# Patient Record
Sex: MALE | Race: WHITE | Employment: FULL TIME | URBAN - METROPOLITAN AREA
[De-identification: names, ages, dates, MRNs, and addresses within clinical notes are randomized per-mention and may not be internally consistent; named-entity substitution may affect disease eponyms.]

---

## 2018-01-09 ENCOUNTER — OFFICE VISIT (OUTPATIENT)
Dept: OTHER | Facility: HOSPITAL | Age: 38
End: 2018-01-09
Attending: ORTHOPAEDIC SURGERY

## 2018-01-09 ENCOUNTER — HOSPITAL ENCOUNTER (OUTPATIENT)
Dept: GENERAL RADIOLOGY | Facility: HOSPITAL | Age: 38
Discharge: HOME | End: 2018-01-09
Attending: ORTHOPAEDIC SURGERY

## 2018-01-09 DIAGNOSIS — R52 PAIN: ICD-10-CM

## 2018-01-09 DIAGNOSIS — R52 PAIN: Primary | ICD-10-CM

## 2018-01-09 PROCEDURE — 73562 X-RAY EXAM OF KNEE 3: CPT | Performed by: ORTHOPAEDIC SURGERY

## 2018-05-08 ENCOUNTER — APPOINTMENT (OUTPATIENT)
Dept: OTHER | Facility: HOSPITAL | Age: 38
End: 2018-05-08
Attending: ORTHOPAEDIC SURGERY

## 2018-06-04 ENCOUNTER — OFFICE VISIT (OUTPATIENT)
Dept: INTERNAL MEDICINE CLINIC | Facility: CLINIC | Age: 38
End: 2018-06-04

## 2018-06-04 VITALS
SYSTOLIC BLOOD PRESSURE: 134 MMHG | WEIGHT: 244 LBS | TEMPERATURE: 98 F | HEIGHT: 72 IN | HEART RATE: 84 BPM | DIASTOLIC BLOOD PRESSURE: 82 MMHG | BODY MASS INDEX: 33.05 KG/M2 | RESPIRATION RATE: 12 BRPM | OXYGEN SATURATION: 98 %

## 2018-06-04 DIAGNOSIS — R19.7 DIARRHEA, UNSPECIFIED TYPE: ICD-10-CM

## 2018-06-04 DIAGNOSIS — Z00.00 PHYSICAL EXAM: ICD-10-CM

## 2018-06-04 DIAGNOSIS — J30.2 SEASONAL ALLERGIES: Primary | ICD-10-CM

## 2018-06-04 PROCEDURE — 99202 OFFICE O/P NEW SF 15 MIN: CPT | Performed by: FAMILY MEDICINE

## 2018-06-04 NOTE — PROGRESS NOTES
Milan Schaumann is a 40year old male and is new to our office. Patient presents today to establish care.     Past pcp:ynanick Berry. - is retiring      Pt presents today for :      Looking for new doctor    Thinks getting allergies as 98%   BMI 33.09 kg/m²   GEN:  Well developed, well nourished, in no apparent distress  EYE: Bilateral conjunctiva and lids normal  HENT: Moist oral mucosa, normal teeth  NECK: No lymphadenopathy or masses  CAR:  RRR, no murmurs, S1 and S2 normal  PULM: Manjinder

## 2018-06-08 ENCOUNTER — TELEPHONE (OUTPATIENT)
Dept: INTERNAL MEDICINE CLINIC | Facility: CLINIC | Age: 38
End: 2018-06-08

## 2018-06-08 NOTE — TELEPHONE ENCOUNTER
Requested Medical records from Dr Erika Marquez, [address] [place] [address], [phone] fax [phone]

## 2018-06-11 ENCOUNTER — NURSE ONLY (OUTPATIENT)
Dept: INTERNAL MEDICINE CLINIC | Facility: CLINIC | Age: 38
End: 2018-06-11

## 2018-06-11 DIAGNOSIS — R19.7 DIARRHEA, UNSPECIFIED TYPE: ICD-10-CM

## 2018-06-11 DIAGNOSIS — Z00.00 PHYSICAL EXAM: ICD-10-CM

## 2018-06-11 DIAGNOSIS — J30.2 SEASONAL ALLERGIES: ICD-10-CM

## 2018-06-11 PROCEDURE — 86003 ALLG SPEC IGE CRUDE XTRC EA: CPT | Performed by: FAMILY MEDICINE

## 2018-06-11 PROCEDURE — 85025 COMPLETE CBC W/AUTO DIFF WBC: CPT | Performed by: FAMILY MEDICINE

## 2018-06-11 PROCEDURE — 80053 COMPREHEN METABOLIC PANEL: CPT | Performed by: FAMILY MEDICINE

## 2018-06-11 PROCEDURE — 36415 COLL VENOUS BLD VENIPUNCTURE: CPT | Performed by: FAMILY MEDICINE

## 2018-06-11 PROCEDURE — 82785 ASSAY OF IGE: CPT | Performed by: FAMILY MEDICINE

## 2018-06-11 PROCEDURE — 80061 LIPID PANEL: CPT | Performed by: FAMILY MEDICINE

## 2018-06-13 PROBLEM — E78.49 OTHER HYPERLIPIDEMIA: Status: ACTIVE | Noted: 2018-06-13

## 2018-06-13 PROBLEM — E78.49 OTHER HYPERLIPIDEMIA: Status: RESOLVED | Noted: 2018-06-13 | Resolved: 2018-06-13

## 2018-06-13 PROBLEM — E78.2 MIXED HYPERLIPIDEMIA: Status: ACTIVE | Noted: 2018-06-13

## 2018-07-19 ENCOUNTER — OFFICE VISIT (OUTPATIENT)
Dept: INTERNAL MEDICINE CLINIC | Facility: CLINIC | Age: 38
End: 2018-07-19
Payer: COMMERCIAL

## 2018-07-19 VITALS
OXYGEN SATURATION: 98 % | HEIGHT: 72 IN | TEMPERATURE: 98 F | DIASTOLIC BLOOD PRESSURE: 70 MMHG | SYSTOLIC BLOOD PRESSURE: 124 MMHG | BODY MASS INDEX: 32.23 KG/M2 | WEIGHT: 238 LBS | HEART RATE: 67 BPM

## 2018-07-19 DIAGNOSIS — Z00.00 PHYSICAL EXAM: Primary | ICD-10-CM

## 2018-07-19 DIAGNOSIS — K90.49 FOOD INTOLERANCE: ICD-10-CM

## 2018-07-19 DIAGNOSIS — E78.5 HYPERLIPIDEMIA, UNSPECIFIED HYPERLIPIDEMIA TYPE: ICD-10-CM

## 2018-07-19 PROCEDURE — 99395 PREV VISIT EST AGE 18-39: CPT | Performed by: FAMILY MEDICINE

## 2018-07-19 NOTE — PROGRESS NOTES
Anabell Johnson is a 40year old male who presents for a complete physical exam.   HPI:       Diet: working on  Exercise: yes  Smoker: No   Fam hx prostate cancer: No  Concerns:    Concerned about egg allergy  Whenever eats eggs gets diarrhea    Working on Pulse 67   Temp 98.1 °F (36.7 °C)   Ht 72\"   Wt 238 lb   SpO2 98%   BMI 32.28 kg/m²   Body mass index is 32.28 kg/m².    GENERAL: well developed, well nourished,in no apparent distress  SKIN: no rashes,no suspicious lesions  HEENT: atraumatic, normocepha

## 2018-07-27 ENCOUNTER — HOSPITAL ENCOUNTER (OUTPATIENT)
Age: 38
Discharge: HOME | End: 2018-07-27
Attending: EMERGENCY MEDICINE
Payer: COMMERCIAL

## 2018-07-27 VITALS
HEART RATE: 72 BPM | RESPIRATION RATE: 18 BRPM | WEIGHT: 235 LBS | TEMPERATURE: 98 F | DIASTOLIC BLOOD PRESSURE: 83 MMHG | OXYGEN SATURATION: 99 % | HEIGHT: 71 IN | BODY MASS INDEX: 32.9 KG/M2 | SYSTOLIC BLOOD PRESSURE: 145 MMHG
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
## 2018-07-27 PROCEDURE — 99213 OFFICE O/P EST LOW 20 MIN: CPT

## 2018-07-27 PROCEDURE — 99203 OFFICE O/P NEW LOW 30 MIN: CPT

## 2018-07-27 RX ORDER — CEFADROXIL 500 MG/1
500 CAPSULE ORAL 2 TIMES DAILY
Qty: 20 CAPSULE | Refills: 0 | Status: SHIPPED | OUTPATIENT
Start: 2018-07-27 | End: 2018-08-06

## 2018-07-27 NOTE — ED PROVIDER NOTES
Patient Seen in: [address]    History   Stated Complaint: bug bite    HPI    This patient complains of a painful swollen area to his posterior neck which has been present for several days.   Patient thinks he was initial there are no gross cranial nerve deficits patient moves all 4 extremities without impairment    icc Course   Under sterile conditions the patient's skin was cleansed with Shur-Clens area the patient was given 1% lidocaine.   Incision was made in the area wi

## 2018-07-27 NOTE — ED INITIAL ASSESSMENT (HPI)
Patient is here with complaints of a red hard bump on the back of his neck that he states was a bug bite that he got about a week ago.

## 2018-07-31 ENCOUNTER — NURSE ONLY (OUTPATIENT)
Dept: ALLERGY | Facility: CLINIC | Age: 38
End: 2018-07-31
Payer: COMMERCIAL

## 2018-07-31 ENCOUNTER — OFFICE VISIT (OUTPATIENT)
Dept: ALLERGY | Facility: CLINIC | Age: 38
End: 2018-07-31
Payer: COMMERCIAL

## 2018-07-31 VITALS
HEART RATE: 71 BPM | DIASTOLIC BLOOD PRESSURE: 84 MMHG | HEIGHT: 73 IN | SYSTOLIC BLOOD PRESSURE: 133 MMHG | BODY MASS INDEX: 31.94 KG/M2 | TEMPERATURE: 98 F | WEIGHT: 241 LBS | RESPIRATION RATE: 18 BRPM

## 2018-07-31 DIAGNOSIS — K90.49 FOOD INTOLERANCE: ICD-10-CM

## 2018-07-31 DIAGNOSIS — T78.1XXA ADVERSE FOOD REACTION, INITIAL ENCOUNTER: Primary | ICD-10-CM

## 2018-07-31 DIAGNOSIS — J30.1 SEASONAL ALLERGIC RHINITIS DUE TO POLLEN: ICD-10-CM

## 2018-07-31 DIAGNOSIS — R19.7 DIARRHEA, UNSPECIFIED TYPE: ICD-10-CM

## 2018-07-31 DIAGNOSIS — Z91.018 FOOD ALLERGY: ICD-10-CM

## 2018-07-31 PROCEDURE — 99244 OFF/OP CNSLTJ NEW/EST MOD 40: CPT | Performed by: ALLERGY & IMMUNOLOGY

## 2018-07-31 PROCEDURE — 99212 OFFICE O/P EST SF 10 MIN: CPT | Performed by: ALLERGY & IMMUNOLOGY

## 2018-07-31 PROCEDURE — 95004 PERQ TESTS W/ALRGNC XTRCS: CPT | Performed by: ALLERGY & IMMUNOLOGY

## 2018-07-31 NOTE — PATIENT INSTRUCTIONS
1.  Adverse food reaction/food allergy patient reports    Recs; handouts on food allergies versus food intolerances provided and reviewed  Given his negative skin testing a food intolerance is more likely  Recommend to avoid those foods that cause unwanted

## 2018-07-31 NOTE — PROGRESS NOTES
Asif Bro is a 40year old male. HPI:   Patient presents with:  Establish Care: tiffanie test       Patient is a 44-year-old male who presents for allergy consultation upon referral of his PCP, Dr. Sharlot Bernheim with a chief complaint of allergies.     Pr total) by mouth 2 (two) times daily.  Disp: 20 capsule Rfl: 0       Allergies:    Iodine (Topical)        RASH      ROS:     Allergic/Immuno:  See HPI  Cardiovascular:  Negative for irregular heartbeat/palpitations, chest pain, edema  Constitutional:  Negat pollen  Diarrhea, unspecified type    1. Adverse food reaction/food allergy patient reports  Diarrhea within minutes to 2 hours of eating scrambled eggs omelettes and eggs over easy.   Patient tolerates foods baked and cooked with eggs in them including br effects.  Teaching was provided via the teach back method

## 2018-11-02 ENCOUNTER — TELEPHONE (OUTPATIENT)
Dept: INTERNAL MEDICINE CLINIC | Facility: CLINIC | Age: 38
End: 2018-11-02

## 2018-11-02 ENCOUNTER — NURSE ONLY (OUTPATIENT)
Dept: INTERNAL MEDICINE CLINIC | Facility: CLINIC | Age: 38
End: 2018-11-02
Payer: COMMERCIAL

## 2018-11-02 PROCEDURE — 90471 IMMUNIZATION ADMIN: CPT | Performed by: FAMILY MEDICINE

## 2018-11-02 PROCEDURE — 90686 IIV4 VACC NO PRSV 0.5 ML IM: CPT | Performed by: FAMILY MEDICINE

## 2018-11-02 NOTE — PROGRESS NOTES
Per Dr. Ariella Price order, patient received flu vaccine in left arm. Patient tolerated vaccine well.

## 2019-05-08 ENCOUNTER — TELEPHONE (OUTPATIENT)
Dept: INTERNAL MEDICINE CLINIC | Facility: CLINIC | Age: 39
End: 2019-05-08

## 2019-05-10 ENCOUNTER — NURSE ONLY (OUTPATIENT)
Dept: INTERNAL MEDICINE CLINIC | Facility: CLINIC | Age: 39
End: 2019-05-10
Payer: COMMERCIAL

## 2019-05-10 DIAGNOSIS — E78.5 HYPERLIPIDEMIA, UNSPECIFIED HYPERLIPIDEMIA TYPE: ICD-10-CM

## 2019-05-10 PROCEDURE — 36415 COLL VENOUS BLD VENIPUNCTURE: CPT | Performed by: FAMILY MEDICINE

## 2019-05-10 PROCEDURE — 80061 LIPID PANEL: CPT | Performed by: FAMILY MEDICINE

## 2019-06-27 ENCOUNTER — OFFICE VISIT (OUTPATIENT)
Dept: INTERNAL MEDICINE CLINIC | Facility: CLINIC | Age: 39
End: 2019-06-27
Payer: COMMERCIAL

## 2019-06-27 VITALS
DIASTOLIC BLOOD PRESSURE: 88 MMHG | WEIGHT: 241 LBS | OXYGEN SATURATION: 99 % | SYSTOLIC BLOOD PRESSURE: 128 MMHG | HEART RATE: 65 BPM | HEIGHT: 73 IN | BODY MASS INDEX: 31.94 KG/M2

## 2019-06-27 DIAGNOSIS — E78.2 MIXED HYPERLIPIDEMIA: ICD-10-CM

## 2019-06-27 DIAGNOSIS — R06.02 EXERTIONAL SHORTNESS OF BREATH: ICD-10-CM

## 2019-06-27 DIAGNOSIS — Z00.00 PHYSICAL EXAM: Primary | ICD-10-CM

## 2019-06-27 PROCEDURE — 93000 ELECTROCARDIOGRAM COMPLETE: CPT | Performed by: FAMILY MEDICINE

## 2019-06-27 PROCEDURE — 99395 PREV VISIT EST AGE 18-39: CPT | Performed by: FAMILY MEDICINE

## 2019-06-27 NOTE — PATIENT INSTRUCTIONS
Omega 3 fish oil daily    Increase exercise    Low cholesterol diet    Recheck cholesterol fasting 6 mos

## 2019-06-27 NOTE — PROGRESS NOTES
Radha Potts is a 45year old male who presents for a complete physical exam.   HPI:       Diet: working on  Exercise: a little   Smoker: No   Fam hx prostate cancer: No  Concerns:  Not exercising much and not eating good    Bad seasonal allergies    Kn History:  Social History    Tobacco Use      Smoking status: Never Smoker      Smokeless tobacco: Never Used    Alcohol use: Yes      Comment: social    Drug use: Not on file          REVIEW OF SYSTEMS:   GENERAL: feels well otherwise  SKIN: denies any unu CARD TREADMILL STRESS, ADULT (CPT=93017); Future      No orders of the defined types were placed in this encounter.       Meds & Refills for this Visit:  Requested Prescriptions      No prescriptions requested or ordered in this encounter       [employer]

## 2019-07-17 ENCOUNTER — HOSPITAL ENCOUNTER (OUTPATIENT)
Dept: CV DIAGNOSTICS | Facility: HOSPITAL | Age: 39
Discharge: HOME | End: 2019-07-17
Attending: FAMILY MEDICINE
Payer: COMMERCIAL

## 2019-07-17 DIAGNOSIS — R06.02 EXERTIONAL SHORTNESS OF BREATH: ICD-10-CM

## 2019-07-17 PROCEDURE — 93016 CV STRESS TEST SUPVJ ONLY: CPT | Performed by: FAMILY MEDICINE

## 2019-07-17 PROCEDURE — 93017 CV STRESS TEST TRACING ONLY: CPT | Performed by: FAMILY MEDICINE

## 2019-07-17 PROCEDURE — 93018 CV STRESS TEST I&R ONLY: CPT | Performed by: FAMILY MEDICINE

## 2019-07-18 ENCOUNTER — TELEPHONE (OUTPATIENT)
Dept: INTERNAL MEDICINE CLINIC | Facility: CLINIC | Age: 39
End: 2019-07-18

## 2019-07-19 NOTE — TELEPHONE ENCOUNTER
Spoke w/ pt, he understands his results from Treadmill stress test, was happy with them.  Will f/u in 1 yr for annual physical. CV

## 2019-09-02 ENCOUNTER — HOSPITAL ENCOUNTER (OUTPATIENT)
Age: 39
Discharge: HOME | End: 2019-09-02
Attending: EMERGENCY MEDICINE
Payer: COMMERCIAL

## 2019-09-02 VITALS
BODY MASS INDEX: 31.15 KG/M2 | WEIGHT: 230 LBS | HEIGHT: 72 IN | RESPIRATION RATE: 20 BRPM | DIASTOLIC BLOOD PRESSURE: 79 MMHG | TEMPERATURE: 99 F | SYSTOLIC BLOOD PRESSURE: 120 MMHG | HEART RATE: 96 BPM | OXYGEN SATURATION: 99 %

## 2019-09-02 DIAGNOSIS — M67.40 GANGLION CYST: ICD-10-CM

## 2019-09-02 DIAGNOSIS — J02.0 STREP PHARYNGITIS: Primary | ICD-10-CM

## 2019-09-02 LAB — S PYO AG THROAT QL: POSITIVE

## 2019-09-02 PROCEDURE — 99214 OFFICE O/P EST MOD 30 MIN: CPT

## 2019-09-02 PROCEDURE — 99213 OFFICE O/P EST LOW 20 MIN: CPT

## 2019-09-02 PROCEDURE — 87430 STREP A AG IA: CPT

## 2019-09-02 RX ORDER — AMOXICILLIN 875 MG/1
875 TABLET, COATED ORAL 2 TIMES DAILY
Qty: 20 TABLET | Refills: 0 | Status: SHIPPED | OUTPATIENT
Start: 2019-09-02 | End: 2019-09-12

## 2019-09-02 NOTE — ED PROVIDER NOTES
Patient Seen in: [address]    History   Patient presents with:  Sore Throat  Cyst    Stated Complaint: sore throat    HPI  Patient complains of 2 days of sore throat, body aches and painful swallowing.   Denies fevers or Mouth/Throat: Uvula is midline and mucous membranes are normal. Posterior oropharyngeal erythema present. No oropharyngeal exudate, posterior oropharyngeal edema or tonsillar abscesses.    Eyes: Conjunctivae and EOM are normal.   Neck: Normal range of motio [phone]    Schedule an appointment as soon as possible for a visit in 1 week  For follow-up        Medications Prescribed:  Current Discharge Medication List    START taking these medications    amoxicillin 875 MG Oral Tab  Take 1 tablet (875 mg total

## 2019-09-02 NOTE — ED INITIAL ASSESSMENT (HPI)
Patient reports he has had sore throat since Saturday. Patient reports it was like swallowing razor blades. Patient also reports body aches.

## 2019-11-15 ENCOUNTER — HOSPITAL ENCOUNTER (OUTPATIENT)
Age: 39
Discharge: HOME | End: 2019-11-15
Attending: EMERGENCY MEDICINE
Payer: COMMERCIAL

## 2019-11-15 VITALS
BODY MASS INDEX: 30.88 KG/M2 | WEIGHT: 228 LBS | RESPIRATION RATE: 18 BRPM | HEIGHT: 72 IN | DIASTOLIC BLOOD PRESSURE: 84 MMHG | TEMPERATURE: 98 F | SYSTOLIC BLOOD PRESSURE: 129 MMHG | HEART RATE: 67 BPM | OXYGEN SATURATION: 100 %

## 2019-11-15 DIAGNOSIS — J02.9 PHARYNGITIS, UNSPECIFIED ETIOLOGY: Primary | ICD-10-CM

## 2019-11-15 PROCEDURE — 99212 OFFICE O/P EST SF 10 MIN: CPT

## 2019-11-15 PROCEDURE — 87430 STREP A AG IA: CPT

## 2019-11-15 NOTE — ED PROVIDER NOTES
Patient Seen in: [address]      History   Patient presents with:  Sore Throat    Stated Complaint: sore throat    HPI    51-year-old male presents for evaluation of sore throat.   Patient with sore throat since last nigh negative rapid strep. Discharged with supportive care, outpatient follow-up if not improving as expected, ER precautions. He verbalizes understanding of and agreement with this plan.               Disposition and Plan     Clinical Impression:  Pharyngitis

## 2020-01-29 ENCOUNTER — HOSPITAL ENCOUNTER (OUTPATIENT)
Age: 40
Discharge: HOME | End: 2020-01-29
Attending: EMERGENCY MEDICINE
Payer: COMMERCIAL

## 2020-01-29 VITALS
RESPIRATION RATE: 17 BRPM | TEMPERATURE: 98 F | HEIGHT: 72 IN | WEIGHT: 235 LBS | OXYGEN SATURATION: 98 % | BODY MASS INDEX: 31.83 KG/M2 | HEART RATE: 71 BPM | SYSTOLIC BLOOD PRESSURE: 138 MMHG | DIASTOLIC BLOOD PRESSURE: 92 MMHG

## 2020-01-29 DIAGNOSIS — N50.9 SCROTAL SKIN LESION: Primary | ICD-10-CM

## 2020-01-29 PROCEDURE — 99214 OFFICE O/P EST MOD 30 MIN: CPT

## 2020-01-29 PROCEDURE — 99213 OFFICE O/P EST LOW 20 MIN: CPT

## 2020-01-29 RX ORDER — CEFADROXIL 500 MG/1
500 CAPSULE ORAL 2 TIMES DAILY
Qty: 20 CAPSULE | Refills: 0 | Status: SHIPPED | OUTPATIENT
Start: 2020-01-29 | End: 2020-02-08

## 2020-01-29 NOTE — ED PROVIDER NOTES
Patient Seen in: [address]      History   Patient presents with:  Groin Pain    Stated Complaint: lump in groin    HPI    45 yo male with lump to the scrotum that he noticed today. No trauma. No fever.  No urinary sympto refill takes less than 2 seconds. Neurological:      Mental Status: He is alert. Sensory: No sensory deficit. Motor: No abnormal muscle tone.    Psychiatric:         Mood and Affect: Mood normal.         Behavior: Behavior normal.              E

## 2020-11-23 ENCOUNTER — LAB ENCOUNTER (OUTPATIENT)
Dept: LAB | Facility: REFERENCE LAB | Age: 40
End: 2020-11-23
Attending: FAMILY MEDICINE
Payer: COMMERCIAL

## 2020-11-23 DIAGNOSIS — Z00.00 PHYSICAL EXAM: ICD-10-CM

## 2020-11-23 PROCEDURE — 86900 BLOOD TYPING SEROLOGIC ABO: CPT

## 2020-11-23 PROCEDURE — 86769 SARS-COV-2 COVID-19 ANTIBODY: CPT

## 2020-11-23 PROCEDURE — 86901 BLOOD TYPING SEROLOGIC RH(D): CPT

## 2020-11-23 PROCEDURE — 36415 COLL VENOUS BLD VENIPUNCTURE: CPT

## 2020-11-24 ENCOUNTER — APPOINTMENT (OUTPATIENT)
Dept: LAB | Facility: HOSPITAL | Age: 40
End: 2020-11-24
Attending: FAMILY MEDICINE
Payer: COMMERCIAL

## 2020-11-24 DIAGNOSIS — R09.89 RUNNY NOSE: ICD-10-CM

## 2020-12-03 ENCOUNTER — VIRTUAL PHONE E/M (OUTPATIENT)
Dept: INTERNAL MEDICINE CLINIC | Facility: CLINIC | Age: 40
End: 2020-12-03
Payer: COMMERCIAL

## 2020-12-03 DIAGNOSIS — U07.1 COVID-19: ICD-10-CM

## 2020-12-03 DIAGNOSIS — Z00.00 PHYSICAL EXAM: Primary | ICD-10-CM

## 2020-12-03 PROCEDURE — 99213 OFFICE O/P EST LOW 20 MIN: CPT | Performed by: FAMILY MEDICINE

## 2020-12-03 NOTE — PROGRESS NOTES
Virtual Telephone Check-In  Patient verbally consents to a Virtual/Telephone Check-In visit on 12/3/20  Patient understands and accepts financial responsibility for any deductible, co-insurance and/or co-pays associated with this service.     Duration of th use: Not on file           ROS:  General:  No fever,  fatigue, no weight changes  HEENT:  Denies congestion or nasal discharge  Cardio:  No chest pain   Pulmonary:  Very mild cough, no SOB  GI:  No N/V/D  MS: no complaints   Neuro: no numbness, tingling, w

## 2021-01-14 ENCOUNTER — OFFICE VISIT (OUTPATIENT)
Dept: INTERNAL MEDICINE CLINIC | Facility: CLINIC | Age: 41
End: 2021-01-14
Payer: COMMERCIAL

## 2021-01-14 VITALS
OXYGEN SATURATION: 99 % | HEART RATE: 65 BPM | WEIGHT: 243 LBS | HEIGHT: 73 IN | DIASTOLIC BLOOD PRESSURE: 81 MMHG | SYSTOLIC BLOOD PRESSURE: 130 MMHG | BODY MASS INDEX: 32.2 KG/M2

## 2021-01-14 DIAGNOSIS — Z00.00 PHYSICAL EXAM: Primary | ICD-10-CM

## 2021-01-14 DIAGNOSIS — Z56.6 STRESS AT WORK: ICD-10-CM

## 2021-01-14 DIAGNOSIS — E78.2 MIXED HYPERLIPIDEMIA: ICD-10-CM

## 2021-01-14 LAB
ALBUMIN SERPL-MCNC: 4.5 G/DL (ref 3.4–5)
ALBUMIN/GLOB SERPL: 1.1 {RATIO} (ref 1–2)
ALP LIVER SERPL-CCNC: 83 U/L
ALT SERPL-CCNC: 67 U/L
ANION GAP SERPL CALC-SCNC: 6 MMOL/L (ref 0–18)
AST SERPL-CCNC: 27 U/L (ref 15–37)
BASOPHILS # BLD AUTO: 0.06 X10(3) UL (ref 0–0.2)
BASOPHILS NFR BLD AUTO: 1 %
BILIRUB SERPL-MCNC: 0.6 MG/DL (ref 0.1–2)
BUN BLD-MCNC: 17 MG/DL (ref 7–18)
BUN/CREAT SERPL: 15.3 (ref 10–20)
CALCIUM BLD-MCNC: 9.9 MG/DL (ref 8.5–10.1)
CHLORIDE SERPL-SCNC: 103 MMOL/L (ref 98–112)
CHOLEST SMN-MCNC: 272 MG/DL (ref ?–200)
CO2 SERPL-SCNC: 30 MMOL/L (ref 21–32)
CREAT BLD-MCNC: 1.11 MG/DL
DEPRECATED RDW RBC AUTO: 39.4 FL (ref 35.1–46.3)
EOSINOPHIL # BLD AUTO: 0.13 X10(3) UL (ref 0–0.7)
EOSINOPHIL NFR BLD AUTO: 2.1 %
ERYTHROCYTE [DISTWIDTH] IN BLOOD BY AUTOMATED COUNT: 12.8 % (ref 11–15)
GLOBULIN PLAS-MCNC: 4 G/DL (ref 2.8–4.4)
GLUCOSE BLD-MCNC: 77 MG/DL (ref 70–99)
HCT VFR BLD AUTO: 46.9 %
HDLC SERPL-MCNC: 49 MG/DL (ref 40–59)
HGB BLD-MCNC: 16.2 G/DL
IMM GRANULOCYTES # BLD AUTO: 0.01 X10(3) UL (ref 0–1)
IMM GRANULOCYTES NFR BLD: 0.2 %
LDLC SERPL CALC-MCNC: 189 MG/DL (ref ?–100)
LYMPHOCYTES # BLD AUTO: 2.23 X10(3) UL (ref 1–4)
LYMPHOCYTES NFR BLD AUTO: 36 %
M PROTEIN MFR SERPL ELPH: 8.5 G/DL (ref 6.4–8.2)
MCH RBC QN AUTO: 29.2 PG (ref 26–34)
MCHC RBC AUTO-ENTMCNC: 34.5 G/DL (ref 31–37)
MCV RBC AUTO: 84.7 FL
MONOCYTES # BLD AUTO: 0.45 X10(3) UL (ref 0.1–1)
MONOCYTES NFR BLD AUTO: 7.3 %
NEUTROPHILS # BLD AUTO: 3.31 X10 (3) UL (ref 1.5–7.7)
NEUTROPHILS # BLD AUTO: 3.31 X10(3) UL (ref 1.5–7.7)
NEUTROPHILS NFR BLD AUTO: 53.4 %
NONHDLC SERPL-MCNC: 223 MG/DL (ref ?–130)
OSMOLALITY SERPL CALC.SUM OF ELEC: 288 MOSM/KG (ref 275–295)
PATIENT FASTING Y/N/NP: YES
PATIENT FASTING Y/N/NP: YES
PLATELET # BLD AUTO: 246 10(3)UL (ref 150–450)
POTASSIUM SERPL-SCNC: 4 MMOL/L (ref 3.5–5.1)
RBC # BLD AUTO: 5.54 X10(6)UL
SODIUM SERPL-SCNC: 139 MMOL/L (ref 136–145)
TRIGL SERPL-MCNC: 169 MG/DL (ref 30–149)
VLDLC SERPL CALC-MCNC: 34 MG/DL (ref 0–30)
WBC # BLD AUTO: 6.2 X10(3) UL (ref 4–11)

## 2021-01-14 PROCEDURE — 3075F SYST BP GE 130 - 139MM HG: CPT | Performed by: FAMILY MEDICINE

## 2021-01-14 PROCEDURE — 85025 COMPLETE CBC W/AUTO DIFF WBC: CPT | Performed by: FAMILY MEDICINE

## 2021-01-14 PROCEDURE — 3079F DIAST BP 80-89 MM HG: CPT | Performed by: FAMILY MEDICINE

## 2021-01-14 PROCEDURE — 80053 COMPREHEN METABOLIC PANEL: CPT | Performed by: FAMILY MEDICINE

## 2021-01-14 PROCEDURE — 80061 LIPID PANEL: CPT | Performed by: FAMILY MEDICINE

## 2021-01-14 PROCEDURE — 99396 PREV VISIT EST AGE 40-64: CPT | Performed by: FAMILY MEDICINE

## 2021-01-14 PROCEDURE — 3008F BODY MASS INDEX DOCD: CPT | Performed by: FAMILY MEDICINE

## 2021-01-14 PROCEDURE — G8483 FLU IMM NO ADMIN DOC REA: HCPCS | Performed by: FAMILY MEDICINE

## 2021-01-14 RX ORDER — ASCORBIC ACID 500 MG
1 TABLET ORAL DAILY
COMMUNITY

## 2021-01-14 SDOH — HEALTH STABILITY - MENTAL HEALTH: OTHER PHYSICAL AND MENTAL STRAIN RELATED TO WORK: Z56.6

## 2021-01-14 NOTE — PROGRESS NOTES
Linda Gonzalez is a 36year old male who presents for a complete physical exam.   HPI:         Concerns:  Trying to make changes healthier now  Hasn't been eating or exercising as good w covid     Feels well otherwise  Biggest issue is sleep- doesn't slee Alcohol use: Yes      Comment: social    Drug use: Not on file          REVIEW OF SYSTEMS:   GENERAL: feels well otherwise  SKIN: denies any unusual skin lesions  EYES:denies vision change  HEENT: no hearing changes  LUNGS: denies shortness of breath with PANEL        Orders Placed This Encounter      Influenza Refused      Meds & Refills for this Visit:  Requested Prescriptions      No prescriptions requested or ordered in this encounter       Imaging & Consults:  INFLUENZA REFUSED [facility]

## 2021-03-04 ENCOUNTER — OFFICE VISIT (OUTPATIENT)
Dept: INTERNAL MEDICINE CLINIC | Facility: CLINIC | Age: 41
End: 2021-03-04
Payer: COMMERCIAL

## 2021-03-04 VITALS
BODY MASS INDEX: 33.13 KG/M2 | WEIGHT: 250 LBS | OXYGEN SATURATION: 98 % | SYSTOLIC BLOOD PRESSURE: 120 MMHG | HEART RATE: 70 BPM | HEIGHT: 73 IN | DIASTOLIC BLOOD PRESSURE: 88 MMHG

## 2021-03-04 DIAGNOSIS — E78.2 MIXED HYPERLIPIDEMIA: ICD-10-CM

## 2021-03-04 DIAGNOSIS — F41.0 PANIC ATTACK: ICD-10-CM

## 2021-03-04 DIAGNOSIS — Z00.00 PHYSICAL EXAM: ICD-10-CM

## 2021-03-04 DIAGNOSIS — G47.00 INSOMNIA, UNSPECIFIED TYPE: Primary | ICD-10-CM

## 2021-03-04 LAB
ALBUMIN SERPL-MCNC: 4.2 G/DL (ref 3.4–5)
ALBUMIN/GLOB SERPL: 1.1 {RATIO} (ref 1–2)
ALP LIVER SERPL-CCNC: 76 U/L
ALT SERPL-CCNC: 75 U/L
ANION GAP SERPL CALC-SCNC: 3 MMOL/L (ref 0–18)
AST SERPL-CCNC: 26 U/L (ref 15–37)
BILIRUB SERPL-MCNC: 0.6 MG/DL (ref 0.1–2)
BUN BLD-MCNC: 17 MG/DL (ref 7–18)
BUN/CREAT SERPL: 18.1 (ref 10–20)
CALCIUM BLD-MCNC: 10.1 MG/DL (ref 8.5–10.1)
CHLORIDE SERPL-SCNC: 106 MMOL/L (ref 98–112)
CHOLEST SMN-MCNC: 198 MG/DL (ref ?–200)
CO2 SERPL-SCNC: 30 MMOL/L (ref 21–32)
CREAT BLD-MCNC: 0.94 MG/DL
GLOBULIN PLAS-MCNC: 3.9 G/DL (ref 2.8–4.4)
GLUCOSE BLD-MCNC: 89 MG/DL (ref 70–99)
HDLC SERPL-MCNC: 52 MG/DL (ref 40–59)
LDLC SERPL CALC-MCNC: 121 MG/DL (ref ?–100)
M PROTEIN MFR SERPL ELPH: 8.1 G/DL (ref 6.4–8.2)
NONHDLC SERPL-MCNC: 146 MG/DL (ref ?–130)
OSMOLALITY SERPL CALC.SUM OF ELEC: 289 MOSM/KG (ref 275–295)
PATIENT FASTING Y/N/NP: YES
PATIENT FASTING Y/N/NP: YES
POTASSIUM SERPL-SCNC: 4 MMOL/L (ref 3.5–5.1)
SODIUM SERPL-SCNC: 139 MMOL/L (ref 136–145)
TRIGL SERPL-MCNC: 126 MG/DL (ref 30–149)
VLDLC SERPL CALC-MCNC: 25 MG/DL (ref 0–30)

## 2021-03-04 PROCEDURE — 3074F SYST BP LT 130 MM HG: CPT | Performed by: FAMILY MEDICINE

## 2021-03-04 PROCEDURE — 99214 OFFICE O/P EST MOD 30 MIN: CPT | Performed by: FAMILY MEDICINE

## 2021-03-04 PROCEDURE — 80053 COMPREHEN METABOLIC PANEL: CPT | Performed by: FAMILY MEDICINE

## 2021-03-04 PROCEDURE — 3079F DIAST BP 80-89 MM HG: CPT | Performed by: FAMILY MEDICINE

## 2021-03-04 PROCEDURE — 3008F BODY MASS INDEX DOCD: CPT | Performed by: FAMILY MEDICINE

## 2021-03-04 PROCEDURE — 80061 LIPID PANEL: CPT | Performed by: FAMILY MEDICINE

## 2021-03-04 RX ORDER — TRAZODONE HYDROCHLORIDE 50 MG/1
50 TABLET ORAL NIGHTLY PRN
Qty: 30 TABLET | Refills: 1 | Status: SHIPPED | OUTPATIENT
Start: 2021-03-04

## 2021-03-04 RX ORDER — ALPRAZOLAM 0.25 MG/1
0.25 TABLET ORAL 2 TIMES DAILY PRN
Qty: 20 TABLET | Refills: 1 | Status: SHIPPED | OUTPATIENT
Start: 2021-03-04

## 2021-03-04 NOTE — PROGRESS NOTES
Jocy Tracey is a 36year old male. CC:  Patient presents with:   Follow - Up: f/up on chol med, pt is fasting      HPI:    F/u HL  Cut down on cheese too  Not exercising- busy /stressed  No SE from lipitor         Anxiety/ stress been very bad  2 pan no chills, no change in energy level. HEENT:  Denies all symptoms.   NECK:   no masses, no rigidity  HEART:  No chest pain, no exertional shortness of breath  LUNGS:  No cough,   PSYCH:  No depression, + panic attacks            Vitals: /88 (BP Locat mouth 2 (two) times daily as needed for Anxiety. Dispense: 20 tablet; Refill: 1    3.  Mixed hyperlipidemia    -now on lipitor 10 x 6 weeks  Check labs and may need to increase dose  Cont to work on diet and exercise   - COMP METABOLIC PANEL (14)  - LIPID

## 2021-03-23 ENCOUNTER — PATIENT MESSAGE (OUTPATIENT)
Dept: INTERNAL MEDICINE CLINIC | Facility: CLINIC | Age: 41
End: 2021-03-23

## 2021-03-23 DIAGNOSIS — E78.2 MIXED HYPERLIPIDEMIA: ICD-10-CM

## 2021-03-23 RX ORDER — ATORVASTATIN CALCIUM 10 MG/1
10 TABLET, FILM COATED ORAL DAILY
Qty: 90 TABLET | Refills: 0 | Status: SHIPPED | OUTPATIENT
Start: 2021-03-23 | End: 2021-03-23

## 2021-03-23 RX ORDER — ATORVASTATIN CALCIUM 10 MG/1
10 TABLET, FILM COATED ORAL DAILY
Qty: 90 TABLET | Refills: 0 | Status: SHIPPED | OUTPATIENT
Start: 2021-03-23 | End: 2021-08-04

## 2021-03-23 NOTE — TELEPHONE ENCOUNTER
From: Chung Lee  To: Dianna Marques MD  Sent: 3/23/2021 11:48 AM CDT  Subject: Prescription Question    Can you order me a refill for the lipitor. I also wouldn't mind trying the actual brand name versus the generic.  Same pharmacy [facility]

## 2021-08-03 DIAGNOSIS — E78.2 MIXED HYPERLIPIDEMIA: ICD-10-CM

## 2021-08-04 RX ORDER — ATORVASTATIN CALCIUM 10 MG/1
TABLET, FILM COATED ORAL
Qty: 90 TABLET | Refills: 0 | Status: SHIPPED | OUTPATIENT
Start: 2021-08-04 | End: 2021-12-13

## 2021-10-07 ENCOUNTER — TELEMEDICINE (OUTPATIENT)
Dept: INTERNAL MEDICINE CLINIC | Facility: CLINIC | Age: 41
End: 2021-10-07

## 2021-10-07 DIAGNOSIS — R53.83 OTHER FATIGUE: Primary | ICD-10-CM

## 2021-10-07 DIAGNOSIS — Z00.00 PHYSICAL EXAM: ICD-10-CM

## 2021-10-07 NOTE — PROGRESS NOTES
Virtual Telephone Check-In  Patient verbally consents to a Virtual/Telephone Check-In visit on   Patient understands and accepts financial responsibility for any deductible, co-insurance and/or co-pays associated with this service.     Duration of the servi Alert, appropriate, no acute distress  Pt speaking comfortably in full sentence  Psych: normal speech       Assessment and Plan:      1.  Other fatigue  Feels better  Was able to sleep    Note for work written  There are no diagnoses linked to this encounte

## 2021-12-12 DIAGNOSIS — E78.2 MIXED HYPERLIPIDEMIA: ICD-10-CM

## 2021-12-13 RX ORDER — ATORVASTATIN CALCIUM 10 MG/1
TABLET, FILM COATED ORAL
Qty: 90 TABLET | Refills: 0 | Status: SHIPPED | OUTPATIENT
Start: 2021-12-13

## 2022-01-06 ENCOUNTER — E-VISIT (OUTPATIENT)
Dept: TELEHEALTH | Age: 42
End: 2022-01-06

## 2022-01-06 DIAGNOSIS — Z20.822 ENCOUNTER BY TELEHEALTH FOR SUSPECTED COVID-19: Primary | ICD-10-CM

## 2022-01-06 PROCEDURE — 99421 OL DIG E/M SVC 5-10 MIN: CPT | Performed by: NURSE PRACTITIONER

## 2022-01-07 NOTE — PROGRESS NOTES
Patient requested an E-Visit. After reviewing history, symptoms and  Ordering laboratory testing, (8) minutes of time was accrued. Please see E-Visit for further information.

## 2022-01-12 ENCOUNTER — TELEPHONE (OUTPATIENT)
Dept: INTERNAL MEDICINE CLINIC | Facility: CLINIC | Age: 42
End: 2022-01-12

## 2022-01-12 NOTE — TELEPHONE ENCOUNTER
Patient left a voicemail needing a return to work note, as he was off from work, due to his son being home with Covid. Tried calling patient back, but no answer.

## 2022-01-13 ENCOUNTER — TELEMEDICINE (OUTPATIENT)
Dept: INTERNAL MEDICINE CLINIC | Facility: CLINIC | Age: 42
End: 2022-01-13
Payer: COMMERCIAL

## 2022-01-13 ENCOUNTER — NURSE ONLY (OUTPATIENT)
Dept: LAB | Facility: HOSPITAL | Age: 42
End: 2022-01-13
Attending: NURSE PRACTITIONER
Payer: COMMERCIAL

## 2022-01-13 DIAGNOSIS — Z20.822 ENCOUNTER BY TELEHEALTH FOR SUSPECTED COVID-19: ICD-10-CM

## 2022-01-13 DIAGNOSIS — R19.7 DIARRHEA, UNSPECIFIED TYPE: Primary | ICD-10-CM

## 2022-01-13 PROCEDURE — 99213 OFFICE O/P EST LOW 20 MIN: CPT | Performed by: FAMILY MEDICINE

## 2022-01-13 NOTE — ASSESSMENT & PLAN NOTE
Patient with recent history of diarrhea, possibly secondary to gastroenteritis. Feels completely fine at this time, no further symptoms. Has a COVID test pending.   Advised that in the future if with similar symptoms to get a COVID test.  With recent CDC

## 2022-01-13 NOTE — PROGRESS NOTES
Telehealth outside of [address] Verbal Consent   I conducted a telehealth visit with Emmy Scanlon today, 01/13/22, which was completed using two-way, real-time interactive audio and video communication.  This has been done in [place] [employer] work  -wife strep throat, kid has strep throat  -he is already back at work, but wants a note stating that he can go back to work as a   -he is feeling completely fine  -he did get a COVID test recently, it is pending  -currently is not vaccinated Years of education: Not on file      Highest education level: Not on file    Occupational History      Not on file    Tobacco Use      Smoking status: Never Smoker      Smokeless tobacco: Never Used    Vaping Use      Vaping Use: Never used    Substance an MOABSO 0.45 01/14/2021    EOABSO 0.13 01/14/2021    BAABSO 0.06 01/14/2021       Lab Results   Component Value Date     03/04/2021    K 4.0 03/04/2021     03/04/2021    CO2 30.0 03/04/2021    ANIONGAP 3 03/04/2021    BUN 17 03/04/2021    CREATS

## 2022-01-14 LAB — SARS-COV-2 RNA RESP QL NAA+PROBE: NOT DETECTED

## 2022-02-28 ENCOUNTER — OFFICE VISIT (OUTPATIENT)
Dept: INTERNAL MEDICINE CLINIC | Facility: CLINIC | Age: 42
End: 2022-02-28
Payer: COMMERCIAL

## 2022-02-28 VITALS
HEART RATE: 75 BPM | WEIGHT: 249 LBS | SYSTOLIC BLOOD PRESSURE: 124 MMHG | DIASTOLIC BLOOD PRESSURE: 86 MMHG | OXYGEN SATURATION: 98 % | HEIGHT: 73 IN | BODY MASS INDEX: 33 KG/M2

## 2022-02-28 DIAGNOSIS — M79.644 PAIN OF FINGER OF RIGHT HAND: ICD-10-CM

## 2022-02-28 DIAGNOSIS — E78.2 MIXED HYPERLIPIDEMIA: ICD-10-CM

## 2022-02-28 DIAGNOSIS — Z00.00 PHYSICAL EXAM: Primary | ICD-10-CM

## 2022-02-28 PROBLEM — R79.89 ELEVATED LFTS: Status: ACTIVE | Noted: 2022-02-28

## 2022-02-28 LAB
ALBUMIN SERPL-MCNC: 4.3 G/DL (ref 3.4–5)
ALBUMIN/GLOB SERPL: 1 {RATIO} (ref 1–2)
ALP LIVER SERPL-CCNC: 78 U/L
ALT SERPL-CCNC: 97 U/L
ANION GAP SERPL CALC-SCNC: 3 MMOL/L (ref 0–18)
AST SERPL-CCNC: 57 U/L (ref 15–37)
BASOPHILS # BLD AUTO: 0.06 X10(3) UL (ref 0–0.2)
BASOPHILS NFR BLD AUTO: 0.9 %
BILIRUB SERPL-MCNC: 0.5 MG/DL (ref 0.1–2)
BUN BLD-MCNC: 19 MG/DL (ref 7–18)
BUN/CREAT SERPL: 18.6 (ref 10–20)
CALCIUM BLD-MCNC: 9.6 MG/DL (ref 8.5–10.1)
CHLORIDE SERPL-SCNC: 109 MMOL/L (ref 98–112)
CHOLEST SERPL-MCNC: 199 MG/DL (ref ?–200)
CO2 SERPL-SCNC: 30 MMOL/L (ref 21–32)
CREAT BLD-MCNC: 1.02 MG/DL
DEPRECATED RDW RBC AUTO: 41.5 FL (ref 35.1–46.3)
EOSINOPHIL # BLD AUTO: 0.19 X10(3) UL (ref 0–0.7)
EOSINOPHIL NFR BLD AUTO: 2.9 %
ERYTHROCYTE [DISTWIDTH] IN BLOOD BY AUTOMATED COUNT: 12.7 % (ref 11–15)
FASTING PATIENT LIPID ANSWER: YES
FASTING STATUS PATIENT QL REPORTED: YES
GLOBULIN PLAS-MCNC: 4.1 G/DL (ref 2.8–4.4)
GLUCOSE BLD-MCNC: 95 MG/DL (ref 70–99)
HCT VFR BLD AUTO: 48.1 %
HDLC SERPL-MCNC: 44 MG/DL (ref 40–59)
HGB BLD-MCNC: 15.8 G/DL
IMM GRANULOCYTES # BLD AUTO: 0.02 X10(3) UL (ref 0–1)
IMM GRANULOCYTES NFR BLD: 0.3 %
LDLC SERPL CALC-MCNC: 132 MG/DL (ref ?–100)
LYMPHOCYTES # BLD AUTO: 2.35 X10(3) UL (ref 1–4)
LYMPHOCYTES NFR BLD AUTO: 35.5 %
MCH RBC QN AUTO: 29.3 PG (ref 26–34)
MCHC RBC AUTO-ENTMCNC: 32.8 G/DL (ref 31–37)
MCV RBC AUTO: 89.2 FL
MONOCYTES # BLD AUTO: 0.39 X10(3) UL (ref 0.1–1)
MONOCYTES NFR BLD AUTO: 5.9 %
NEUTROPHILS # BLD AUTO: 3.61 X10 (3) UL (ref 1.5–7.7)
NEUTROPHILS # BLD AUTO: 3.61 X10(3) UL (ref 1.5–7.7)
NEUTROPHILS NFR BLD AUTO: 54.5 %
NONHDLC SERPL-MCNC: 155 MG/DL (ref ?–130)
OSMOLALITY SERPL CALC.SUM OF ELEC: 296 MOSM/KG (ref 275–295)
PLATELET # BLD AUTO: 237 10(3)UL (ref 150–450)
POTASSIUM SERPL-SCNC: 4.5 MMOL/L (ref 3.5–5.1)
PROT SERPL-MCNC: 8.4 G/DL (ref 6.4–8.2)
RBC # BLD AUTO: 5.39 X10(6)UL
SODIUM SERPL-SCNC: 142 MMOL/L (ref 136–145)
TRIGL SERPL-MCNC: 130 MG/DL (ref 30–149)
VLDLC SERPL CALC-MCNC: 24 MG/DL (ref 0–30)
WBC # BLD AUTO: 6.6 X10(3) UL (ref 4–11)

## 2022-02-28 PROCEDURE — 3079F DIAST BP 80-89 MM HG: CPT | Performed by: FAMILY MEDICINE

## 2022-02-28 PROCEDURE — 3008F BODY MASS INDEX DOCD: CPT | Performed by: FAMILY MEDICINE

## 2022-02-28 PROCEDURE — 3074F SYST BP LT 130 MM HG: CPT | Performed by: FAMILY MEDICINE

## 2022-02-28 PROCEDURE — 80053 COMPREHEN METABOLIC PANEL: CPT | Performed by: FAMILY MEDICINE

## 2022-02-28 PROCEDURE — 85025 COMPLETE CBC W/AUTO DIFF WBC: CPT | Performed by: FAMILY MEDICINE

## 2022-02-28 PROCEDURE — 99396 PREV VISIT EST AGE 40-64: CPT | Performed by: FAMILY MEDICINE

## 2022-02-28 PROCEDURE — 80061 LIPID PANEL: CPT | Performed by: FAMILY MEDICINE

## 2022-05-03 PROBLEM — F43.10 PTSD (POST-TRAUMATIC STRESS DISORDER): Status: ACTIVE | Noted: 2022-05-03

## 2022-05-03 PROBLEM — F41.1 GENERALIZED ANXIETY DISORDER: Status: ACTIVE | Noted: 2022-05-03

## 2022-05-03 PROBLEM — R19.7 DIARRHEA: Status: RESOLVED | Noted: 2022-01-13 | Resolved: 2022-05-03

## 2022-05-03 NOTE — ASSESSMENT & PLAN NOTE
History of anxiety disorder, however I think that PTSD is his predominant diagnosis at this time. Please see PTSD section for assessment and plan. Advise starting SSRI. Has Xanax which he has never taken, but can use if he has severe panic attacks.

## 2022-05-26 ENCOUNTER — TELEPHONE (OUTPATIENT)
Dept: INTERNAL MEDICINE CLINIC | Facility: CLINIC | Age: 42
End: 2022-05-26

## 2022-05-26 NOTE — TELEPHONE ENCOUNTER
Received request for medical records from [address] [place] [address]. [address] [place] [address]. [address] [address]. Request sent to scan stat. Additional Notes: Pain 0/10 Quality 130: Documentation Of Current Medications In The Medical Record: Current Medications Documented Detail Level: Detailed Quality 131: Pain Assessment And Follow-Up: Pain assessment using a standardized tool is documented as negative, no follow-up plan required

## 2022-06-02 PROBLEM — J30.9 ALLERGIC RHINITIS: Status: ACTIVE | Noted: 2022-06-02

## 2022-06-02 NOTE — ASSESSMENT & PLAN NOTE
Patient still with PTSD symptoms, work still a trigger. He recently started on Lexapro 5 mg daily and has been taking it for approximately 2 weeks. He reports no significant changes at this time. I will increase Lexapro to 10 mg daily. Continue seeing therapist.  Continue healthy lifestyle modifications, diet and exercise. I recommend follow-up in 4 weeks.

## 2022-06-30 NOTE — ASSESSMENT & PLAN NOTE
History of elevated liver enzymes, and next office visit we will plan to repeat LFTs and consider liver work-up.

## 2022-07-21 ENCOUNTER — TELEPHONE (OUTPATIENT)
Dept: INTERNAL MEDICINE CLINIC | Facility: CLINIC | Age: 42
End: 2022-07-21

## 2022-07-21 NOTE — TELEPHONE ENCOUNTER
Medical records request from [address] [place] [address], [address] [place] [id number]. Request sent to stat [employer] for processing.

## 2022-08-01 PROBLEM — U07.1 COVID-19: Status: ACTIVE | Noted: 2022-08-01

## 2022-08-01 PROCEDURE — 80053 COMPREHEN METABOLIC PANEL: CPT | Performed by: FAMILY MEDICINE

## 2022-08-01 PROCEDURE — 83540 ASSAY OF IRON: CPT | Performed by: FAMILY MEDICINE

## 2022-08-01 PROCEDURE — 84466 ASSAY OF TRANSFERRIN: CPT | Performed by: FAMILY MEDICINE

## 2022-08-01 PROCEDURE — 86769 SARS-COV-2 COVID-19 ANTIBODY: CPT | Performed by: FAMILY MEDICINE

## 2022-08-01 PROCEDURE — 80074 ACUTE HEPATITIS PANEL: CPT | Performed by: FAMILY MEDICINE

## 2022-08-01 PROCEDURE — 82728 ASSAY OF FERRITIN: CPT | Performed by: FAMILY MEDICINE

## 2022-08-01 PROCEDURE — 80061 LIPID PANEL: CPT | Performed by: FAMILY MEDICINE

## 2022-08-01 NOTE — ASSESSMENT & PLAN NOTE
PTSD and generalized anxiety disorder, waxing/waning symptoms but overall improving. Continue Lexapro 10 mg daily. Continue therapist.  Discussed prazosin given sleep symptoms - patient would like to hold off for now. Follow-up in 1 month to continue monitoring.

## 2022-09-01 PROBLEM — H92.01 RIGHT EAR PAIN: Status: ACTIVE | Noted: 2022-09-01

## 2022-09-01 PROBLEM — U07.1 COVID-19: Status: RESOLVED | Noted: 2022-08-01 | Resolved: 2022-09-01

## 2022-09-01 PROBLEM — H92.01: Status: ACTIVE | Noted: 2022-09-01

## 2022-09-01 NOTE — ASSESSMENT & PLAN NOTE
Reviewed labs today. Still suspect fatty liver, likely weight related. Also advised to cut back on alcohol. Will plan on getting liver ultrasound in the future.

## 2022-09-01 NOTE — ASSESSMENT & PLAN NOTE
PTSD and generalized anxiety disorder. Overall getting better, but some up and downs. Continue Lexapro 10 mg daily. Continue therapy. Follow up in a month.

## 2022-09-01 NOTE — ASSESSMENT & PLAN NOTE
Small papule seen in the ear canal, not-bleeding. TM normal, no evidence of otitis media or externa. Advised not picking at the ear. Follow up as needed.

## 2022-09-30 NOTE — ASSESSMENT & PLAN NOTE
Suspect for fatty liver. He reports that he is going to make changes in his life with regard to his weight. We will plan to repeat CMP during next office visit. We will also plan to get liver ultrasound in the future.

## 2022-09-30 NOTE — ASSESSMENT & PLAN NOTE
Has been taking atorvastatin more regularly now. Will plan to repeat lipid panel next month when he sees me.

## 2022-09-30 NOTE — ASSESSMENT & PLAN NOTE
PTSD and generalized anxiety disorder, recently stress has been much more elevated. Continue Lexapro 10 mg daily. Continue therapy. Follow-up in 1 month.

## 2022-10-10 ENCOUNTER — HOSPITAL ENCOUNTER (OUTPATIENT)
Age: 42
Discharge: HOME | End: 2022-10-10
Payer: COMMERCIAL

## 2022-10-10 ENCOUNTER — APPOINTMENT (OUTPATIENT)
Dept: GENERAL RADIOLOGY | Age: 42
End: 2022-10-10
Attending: NURSE PRACTITIONER
Payer: COMMERCIAL

## 2022-10-10 VITALS
RESPIRATION RATE: 20 BRPM | HEART RATE: 76 BPM | TEMPERATURE: 98 F | DIASTOLIC BLOOD PRESSURE: 86 MMHG | OXYGEN SATURATION: 100 % | SYSTOLIC BLOOD PRESSURE: 126 MMHG

## 2022-10-10 DIAGNOSIS — S93.401A MODERATE RIGHT ANKLE SPRAIN, INITIAL ENCOUNTER: Primary | ICD-10-CM

## 2022-10-10 PROCEDURE — 73610 X-RAY EXAM OF ANKLE: CPT | Performed by: NURSE PRACTITIONER

## 2022-10-10 PROCEDURE — 99203 OFFICE O/P NEW LOW 30 MIN: CPT | Performed by: NURSE PRACTITIONER

## 2022-10-10 PROCEDURE — L4350 ANKLE CONTROL ORTHO PRE OTS: HCPCS | Performed by: NURSE PRACTITIONER

## 2022-10-10 PROCEDURE — A6449 LT COMPRES BAND >=3" <5"/YD: HCPCS | Performed by: NURSE PRACTITIONER

## 2022-10-10 PROCEDURE — E0114 CRUTCH UNDERARM PAIR NO WOOD: HCPCS | Performed by: NURSE PRACTITIONER

## 2022-10-10 NOTE — ED INITIAL ASSESSMENT (HPI)
Patient rolled his right ankle on Saturday playing softball. His has swelling and pain to his right ankle.

## 2022-10-13 DIAGNOSIS — F43.10 PTSD (POST-TRAUMATIC STRESS DISORDER): ICD-10-CM

## 2022-10-13 DIAGNOSIS — F41.1 GENERALIZED ANXIETY DISORDER: ICD-10-CM

## 2022-10-13 RX ORDER — ESCITALOPRAM OXALATE 10 MG/1
10 TABLET ORAL DAILY
Qty: 90 TABLET | Refills: 3 | Status: SHIPPED | OUTPATIENT
Start: 2022-10-13 | End: 2023-01-20

## 2022-10-28 PROBLEM — S93.409A ANKLE SPRAIN: Status: ACTIVE | Noted: 2022-10-28

## 2022-10-28 PROBLEM — H92.01 RIGHT EAR PAIN: Status: RESOLVED | Noted: 2022-09-01 | Resolved: 2022-10-28

## 2022-11-06 DIAGNOSIS — E78.2 MIXED HYPERLIPIDEMIA: ICD-10-CM

## 2022-11-07 RX ORDER — ATORVASTATIN CALCIUM 10 MG/1
TABLET, FILM COATED ORAL
Qty: 90 TABLET | Refills: 0 | Status: SHIPPED | OUTPATIENT
Start: 2022-11-07

## 2022-11-28 ENCOUNTER — HOSPITAL ENCOUNTER (OUTPATIENT)
Age: 42
Discharge: HOME | End: 2022-11-28
Payer: COMMERCIAL

## 2022-11-28 VITALS
SYSTOLIC BLOOD PRESSURE: 125 MMHG | HEART RATE: 84 BPM | TEMPERATURE: 99 F | BODY MASS INDEX: 33.18 KG/M2 | HEIGHT: 72 IN | RESPIRATION RATE: 20 BRPM | WEIGHT: 245 LBS | OXYGEN SATURATION: 97 % | DIASTOLIC BLOOD PRESSURE: 75 MMHG

## 2022-11-28 DIAGNOSIS — H10.32 ACUTE CONJUNCTIVITIS OF LEFT EYE, UNSPECIFIED ACUTE CONJUNCTIVITIS TYPE: ICD-10-CM

## 2022-11-28 DIAGNOSIS — R09.89 CHEST CONGESTION: Primary | ICD-10-CM

## 2022-11-28 PROCEDURE — 99213 OFFICE O/P EST LOW 20 MIN: CPT | Performed by: NURSE PRACTITIONER

## 2022-11-28 RX ORDER — AZITHROMYCIN 250 MG/1
TABLET, FILM COATED ORAL
Qty: 6 TABLET | Refills: 0 | Status: SHIPPED | OUTPATIENT
Start: 2022-11-28 | End: 2022-12-03

## 2022-11-28 RX ORDER — ERYTHROMYCIN 5 MG/G
1 OINTMENT OPHTHALMIC EVERY 6 HOURS
Qty: 1 G | Refills: 0 | Status: SHIPPED | OUTPATIENT
Start: 2022-11-28 | End: 2022-12-05

## 2022-11-29 NOTE — ED INITIAL ASSESSMENT (HPI)
Patient here for evaluation of left eye redness and crusting that started this morning. Has some blurriness when the drainage/crust builds up, otherwise no vision changes. States on 11/23 he had a cough with green mucus and hoarse voice. Mucus was primarily in the morning or late in the evening. Today it is a dry cough, sneezing, with some soreness to the lymph nodes of his neck. He hasn't taken anything for his symptoms. His wife is scheduled on 12/2 for induction.  States he had a negative COVID test yesterday at home and declines a flu test.

## 2022-12-22 DIAGNOSIS — R79.89 ELEVATED LFTS: Primary | ICD-10-CM

## 2022-12-22 PROBLEM — S93.409A ANKLE SPRAIN: Status: RESOLVED | Noted: 2022-10-28 | Resolved: 2022-12-22

## 2022-12-22 PROBLEM — B34.9 VIRAL SYNDROME: Status: ACTIVE | Noted: 2022-12-22

## 2022-12-22 PROCEDURE — 80053 COMPREHEN METABOLIC PANEL: CPT | Performed by: FAMILY MEDICINE

## 2023-01-07 ENCOUNTER — PATIENT MESSAGE (OUTPATIENT)
Dept: INTERNAL MEDICINE CLINIC | Facility: CLINIC | Age: 43
End: 2023-01-07

## 2023-01-20 PROBLEM — B34.9 VIRAL SYNDROME: Status: RESOLVED | Noted: 2022-12-22 | Resolved: 2023-01-20

## 2023-01-20 PROBLEM — R68.84 JAW PAIN: Status: ACTIVE | Noted: 2023-01-20

## 2023-01-27 ENCOUNTER — PATIENT MESSAGE (OUTPATIENT)
Dept: INTERNAL MEDICINE CLINIC | Facility: CLINIC | Age: 43
End: 2023-01-27

## 2023-03-24 ENCOUNTER — PATIENT MESSAGE (OUTPATIENT)
Dept: INTERNAL MEDICINE CLINIC | Facility: CLINIC | Age: 43
End: 2023-03-24

## 2023-04-03 ENCOUNTER — PATIENT MESSAGE (OUTPATIENT)
Dept: INTERNAL MEDICINE CLINIC | Facility: CLINIC | Age: 43
End: 2023-04-03

## 2023-04-03 DIAGNOSIS — M79.643 PAIN OF HAND, UNSPECIFIED LATERALITY: Primary | ICD-10-CM

## 2023-04-25 DIAGNOSIS — F41.1 GENERALIZED ANXIETY DISORDER: ICD-10-CM

## 2023-04-25 DIAGNOSIS — F43.10 PTSD (POST-TRAUMATIC STRESS DISORDER): ICD-10-CM

## 2023-04-25 RX ORDER — ESCITALOPRAM OXALATE 20 MG/1
TABLET ORAL
Qty: 90 TABLET | Refills: 0 | Status: SHIPPED | OUTPATIENT
Start: 2023-04-25

## 2023-05-01 ENCOUNTER — HOSPITAL ENCOUNTER (OUTPATIENT)
Dept: ULTRASOUND IMAGING | Age: 43
Discharge: HOME | End: 2023-05-01
Attending: FAMILY MEDICINE
Payer: COMMERCIAL

## 2023-05-01 DIAGNOSIS — R79.89 ELEVATED LFTS: ICD-10-CM

## 2023-05-01 PROCEDURE — 76705 ECHO EXAM OF ABDOMEN: CPT | Performed by: FAMILY MEDICINE

## 2023-08-11 ENCOUNTER — HOSPITAL ENCOUNTER (OUTPATIENT)
Age: 43
Discharge: HOME | End: 2023-08-11
Payer: COMMERCIAL

## 2023-08-11 VITALS
DIASTOLIC BLOOD PRESSURE: 78 MMHG | RESPIRATION RATE: 18 BRPM | SYSTOLIC BLOOD PRESSURE: 110 MMHG | TEMPERATURE: 98 F | HEART RATE: 71 BPM | OXYGEN SATURATION: 99 %

## 2023-08-11 DIAGNOSIS — Z20.822 LAB TEST NEGATIVE FOR COVID-19 VIRUS: ICD-10-CM

## 2023-08-11 DIAGNOSIS — Z20.822 ENCOUNTER FOR LABORATORY TESTING FOR COVID-19 VIRUS: ICD-10-CM

## 2023-08-11 DIAGNOSIS — J02.9 SORE THROAT: Primary | ICD-10-CM

## 2023-08-11 LAB
S PYO AG THROAT QL: NEGATIVE
SARS-COV-2 RNA RESP QL NAA+PROBE: NOT DETECTED

## 2023-08-11 PROCEDURE — 87880 STREP A ASSAY W/OPTIC: CPT | Performed by: NURSE PRACTITIONER

## 2023-08-11 PROCEDURE — U0002 COVID-19 LAB TEST NON-CDC: HCPCS | Performed by: NURSE PRACTITIONER

## 2023-08-11 PROCEDURE — 99213 OFFICE O/P EST LOW 20 MIN: CPT | Performed by: NURSE PRACTITIONER

## 2024-11-06 NOTE — PROGRESS NOTES
FAMILY MEDICINE CLINIC NOTE    HPI  Anoop Santana is a 43 year old male presenting for physical    #Health Maintenance  -Diet: 60/40 healthy/not healthy.   -Exercise: 4-5 days a week walking 30 minutes  -Lung cancer screen: Not indicated  -Colon cancer screen: Not indicated  -Prostate cancer screen: Not indicated  -Aortic aneurysm screen: Not indicated  -Statin:  Will check lipid panel  -ASA: Not indicated  -HIV screen: Indicated  -Hep C screen: - 8/2022 negative  -Gonorrhea/chlamydia:  Not indicated  -Syphillis: Not indicated  -TB: Not indicated  -Tobacco/alcohol: Per below  -Depression: PHQ-2 score of 0 (score >/= 3 do PHQ-9)  -Advanced Directive: Indicated    #Immunizations  -Tdap: Indicated - thinks completed around 2016  -Flu shot: Indicated - declines  -PCV13: Not indicated   -PCV20: Not indicated   -PPSV23: Not indicated   -HPV: Not indicated  -RZV (preferred) or VZL: Not indicated   -RSV: Not indicated   -COVID: Indicated    #PTSD  #GERMÁN  -related to trauma from  cases  -no SI/HI  -no AVH  -denies scotty symptoms  -violent horrible dreams in the past  -does note some occasional outbursts  -does have hypervigilance   -+exposure, +intrusion, +avoidance, +altered mood, +alerted reactivity  -has seen psychologist  Dr Galindo in the past  -has been on escitalopram in the past  11/7/2024  -not currently on any medication  -alprazolam as needed - especially for flights  -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 Hepatic steatosis versus hepatocellular disease.  No focal hepatic lesions. 2. Normal gallbladder ultrasound.  Normal common duct. 3. No free fluid hepatorenal fossa. No hydronephrosis right kidney.   -referred to GI  -now binge drinking more  regularly     #Hyperlipidemia  -6/2018   -atorvastatin 10 mg - has not been taking, will restart     #Binge drinking  -not drinking regularly  -about 10 drinks  -once a month   -drinks until he nearly blacks out    #Biceps tear---resolved  -histor of left open biceps repair 1/2024   -surgery Dr Tavares  -completed physical therapy     #Headache---resolved  #Jaw pain----resolved  -stress  -family sick, he was sick with strep throat  -motion sickness  -notes maybe grinding teeth at night  -no teeth pain  -does note sensitivity to light and sound  -does not get headaches normally  -feeling of motion sickness to describe headaches    #Patient Care Team  Patient Care Team:  Mariusz Murillo MD as PCP - General (Family Medicine)    ROS  GENERAL: No fever/chills, no recent weight loss   HEENT: No visual changes, no changes in hearing, no sore throats  NECK: No pain, no swelling  RESP: No cough, no SOB  CV: No chest pain, no palpitations  GI: No abd pain, no N/V/D  MSK: No edema, no pain  SKIN: No new rashes  NEURO: No numbness, no tingling, no HA    HEALTH MAINTENANCE CHECKLIST  Health Maintenance Topics with due status: Overdue       Topic Date Due    DTaP,Tdap,and Td Vaccines Never done    Annual Physical 02/28/2023    Annual Depression Screening 01/01/2024    COVID-19 Vaccine Never done    Influenza Vaccine 10/01/2024       ALLERGIES  Allergies[1]    MEDICATIONS  Current Outpatient Medications   Medication Sig Dispense Refill    atorvastatin 10 MG Oral Tab Take 1 tablet (10 mg total) by mouth daily. 90 tablet 3    ALPRAZolam 0.25 MG Oral Tab Take 1 tablet (0.25 mg total) by mouth 2 (two) times daily as needed for Anxiety. 5 tablet 0       ACTIVE PROBLEM  Patient Active Problem List   Diagnosis    Mixed hyperlipidemia    Elevated LFTs    Generalized anxiety disorder    PTSD (post-traumatic stress disorder)    Allergic rhinitis    Alcohol consumption binge drinking    Health maintenance examination    Class 1 obesity  due to excess calories without serious comorbidity with body mass index (BMI) of 33.0 to 33.9 in adult       PAST MEDICAL HISTORY  Past Medical History:    Biceps tendon tear    COVID-19    Generalized anxiety disorder    PTSD (post-traumatic stress disorder)       PAST SOCIAL HISTORY  Social History     Socioeconomic History    Marital status:      Spouse name: Not on file    Number of children: Not on file    Years of education: Not on file    Highest education level: Not on file   Occupational History    Not on file   Tobacco Use    Smoking status: Never    Smokeless tobacco: Never   Vaping Use    Vaping status: Never Used   Substance and Sexual Activity    Alcohol use: Yes     Comment: Binge drinking    Drug use: Not Currently     Types: Cannabis    Sexual activity: Yes     Partners: Female   Other Topics Concern    Caffeine Concern Not Asked    Exercise Not Asked    Seat Belt Not Asked    Special Diet Not Asked    Stress Concern Not Asked    Weight Concern Not Asked   Social History Narrative    Relationships: Wife Martinez    Children: Mane (12M - from Martinez's previous relationship), Emre 6M, Paulina 5F, Mechelle 2F    Pets: None    School: N/A    Work: Retired . IT .     Origin: [place], [place]. Never really .     Interests:  Enjoys golfing. Very handy - likes to build stuff, small projects. Plays softball consistently during the summers. Spending time with kids.    Spiritual: [employer] background, but doesn't go to [employer] a lot     Social Drivers of Health     Financial Resource Strain: Not on file   Food Insecurity: Not on file   Transportation Needs: Not on file   Physical Activity: Not on file   Stress: Not on file   Social Connections: Not on file   Housing Stability: Not on file       PAST SURGICAL HISTORY  Past Surgical History:   Procedure Laterality Date    Knee surgery      knee- ACL, MCL    Repair of biceps tendon at elbow  01/2024       PAST FAMILY  HISTORY  Family History   Problem Relation Age of Onset    Cancer Mother         lung cancer, smoker    Heart Disease Mother     Other (Parathyroid) Mother     Anxiety Mother     Arrhythmia Mother     Diabetes Father     Other (Other) Maternal Grandmother         Aneurysm    Diabetes Maternal Grandfather     Kidney Disease Maternal Grandfather         ESRD    No Known Problems Paternal Grandmother     No Known Problems Paternal Grandfather     Colon Cancer Neg     Prostate Cancer Neg        PHYSICAL EXAM  Vitals:    11/07/24 1247   BP: 124/78   Pulse: 97   Temp: 98 °F (36.7 °C)   SpO2: 98%   Weight: 249 lb (112.9 kg)   Height: 6' (1.829 m)      Body mass index is 33.77 kg/m².    GENERAL: NAD  HEENT: Moist mucous membranes, no tonsillar swelling or erythema, PERRLA bilat, TM translucent and non-bulging  NECK: Supple, non-tender  RESP: CTAB, no wheezing, no rales, no rhonchi  CV: RRR, no murmurs  GI: Soft, non-distended, non-tender, no guarding, no rebound, no masses  MSK: No edema  SKIN: Warm and dry, no rashes  NEURO: Answering questions appropriately    LABS  Lab Results   Component Value Date    WBC 6.6 02/28/2022    HGB 15.8 02/28/2022    HCT 48.1 02/28/2022    .0 02/28/2022    NEPERCENT 54.5 02/28/2022    LYPERCENT 35.5 02/28/2022    MOPERCENT 5.9 02/28/2022    EOPERCENT 2.9 02/28/2022    BAPERCENT 0.9 02/28/2022    NE 3.61 02/28/2022    LYMABS 2.35 02/28/2022    MOABSO 0.39 02/28/2022    EOABSO 0.19 02/28/2022    BAABSO 0.06 02/28/2022       Lab Results   Component Value Date     12/22/2022    K 3.9 12/22/2022     12/22/2022    CO2 32.0 12/22/2022    ANIONGAP 3 12/22/2022    BUN 14 12/22/2022    CREATSERUM 0.96 12/22/2022    BUNCREA 14.6 12/22/2022    GLU 88 12/22/2022    CA 9.7 12/22/2022    OSMOCALC 288 12/22/2022    GFRNAA 100 08/01/2022    GFRAA 116 08/01/2022     (H) 12/22/2022    AST 42 (H) 12/22/2022    ALKPHO 95 12/22/2022    BILT 0.7 12/22/2022    TP 8.4 (H) 12/22/2022    ALB  4.3 12/22/2022    GLOBULIN 4.1 12/22/2022    ELECTAG 1.0 12/22/2022    FASTING Yes 12/22/2022         Lab Results   Component Value Date    CHOLEST 196 08/01/2022    TRIG 224 (H) 08/01/2022    HDL 44 08/01/2022     (H) 08/01/2022    VLDL 39 (H) 08/01/2022    NONHDLC 152 (H) 08/01/2022        DIAGNOSTICS    ASSESSMENT/PLAN  Problem List Items Addressed This Visit          Cardiac and Vasculature    Mixed hyperlipidemia     Restart atorvastatin 10 mg nightly.  Monitor CMP and lipid panel with labs today         Relevant Medications    atorvastatin 10 MG Oral Tab       Endocrine and Metabolic    Class 1 obesity due to excess calories without serious comorbidity with body mass index (BMI) of 33.0 to 33.9 in adult     Diet and exercise advised.  Check labs  Desires to see weight specialist - referral provided.  [facility] weight program information also provided         Relevant Medications    atorvastatin 10 MG Oral Tab    Other Relevant Orders    BARIATRICS - INTERNAL       Mental Health    Alcohol consumption binge drinking     Patient has been intermittently binge drinking.  Consider AA.  If symptoms more serious, consider substance center follow up  Would benefit from maintaining good mental health         Relevant Medications    ALPRAZolam 0.25 MG Oral Tab    Generalized anxiety disorder     PTSD and generalized anxiety disorder.  He reports his mental health is manageable for now.  He prefers not take medication at this time - discussed that this can always be an option though.  Has been on Lexapro in the past.   Has alprazolam that he uses very infrequently for panic attacks and traveling.   Consider restarting therapy.  Follow-up as needed.         Relevant Medications    ALPRAZolam 0.25 MG Oral Tab    PTSD (post-traumatic stress disorder)     PTSD and generalized anxiety disorder.  He reports his mental health is manageable for now.  He prefers not take medication at this time - discussed that this can  always be an option though.  Has been on Lexapro in the past.   Has alprazolam that he uses very infrequently for panic attacks and traveling.   Consider restarting therapy.  Follow-up as needed.         Relevant Medications    ALPRAZolam 0.25 MG Oral Tab       Gastrointestinal and Abdominal    Elevated LFTs     Continue healthy diet and exercise  Cut back on alcohol usage.  Will refer to GI for further evaluation.         Relevant Orders    GASTRO - INTERNAL       EarNoseThroat    Allergic rhinitis     Symptoms consistent with allergic rhinitis.  Fluticasone nasal spray.  Cetirizine as needed         RESOLVED: Jaw pain     Resolved            Health Encounters    Health maintenance examination - Primary     Exercise and diet advised.  CBC, CMP, lipid panel, Hba1c, HIV screen  Tdap deferred  Flu shot declined  COVID vaccine advised.  Advanced directive information provided.         Relevant Orders    Comp Metabolic Panel (14)    Lipid Panel    Hemoglobin A1C    HIV Ag/Ab Combo    CBC With Differential With Platelet       Return in about 1 year (around 11/7/2025) for physical.    Mariusz Murillo MD  Family Medicine         [1]   Allergies  Allergen Reactions    Seasonal FACE FLUSHING    Iodine (Topical) RASH

## 2024-11-07 ENCOUNTER — OFFICE VISIT (OUTPATIENT)
Dept: INTERNAL MEDICINE CLINIC | Facility: CLINIC | Age: 44
End: 2024-11-07
Payer: COMMERCIAL

## 2024-11-07 VITALS
BODY MASS INDEX: 33.72 KG/M2 | OXYGEN SATURATION: 98 % | TEMPERATURE: 98 F | SYSTOLIC BLOOD PRESSURE: 124 MMHG | HEIGHT: 72 IN | WEIGHT: 249 LBS | DIASTOLIC BLOOD PRESSURE: 78 MMHG | HEART RATE: 97 BPM

## 2024-11-07 DIAGNOSIS — F10.10 ALCOHOL CONSUMPTION BINGE DRINKING: ICD-10-CM

## 2024-11-07 DIAGNOSIS — Z00.00 HEALTH MAINTENANCE EXAMINATION: Primary | ICD-10-CM

## 2024-11-07 DIAGNOSIS — F43.10 PTSD (POST-TRAUMATIC STRESS DISORDER): ICD-10-CM

## 2024-11-07 DIAGNOSIS — R79.89 ELEVATED LFTS: ICD-10-CM

## 2024-11-07 DIAGNOSIS — J30.9 ALLERGIC RHINITIS, UNSPECIFIED SEASONALITY, UNSPECIFIED TRIGGER: ICD-10-CM

## 2024-11-07 DIAGNOSIS — R68.84 JAW PAIN: ICD-10-CM

## 2024-11-07 DIAGNOSIS — E66.811 CLASS 1 OBESITY DUE TO EXCESS CALORIES WITHOUT SERIOUS COMORBIDITY WITH BODY MASS INDEX (BMI) OF 33.0 TO 33.9 IN ADULT: ICD-10-CM

## 2024-11-07 DIAGNOSIS — F41.1 GENERALIZED ANXIETY DISORDER: ICD-10-CM

## 2024-11-07 DIAGNOSIS — E66.09 CLASS 1 OBESITY DUE TO EXCESS CALORIES WITHOUT SERIOUS COMORBIDITY WITH BODY MASS INDEX (BMI) OF 33.0 TO 33.9 IN ADULT: ICD-10-CM

## 2024-11-07 DIAGNOSIS — E78.2 MIXED HYPERLIPIDEMIA: ICD-10-CM

## 2024-11-07 LAB
ALBUMIN SERPL-MCNC: 5.2 G/DL (ref 3.2–4.8)
ALBUMIN/GLOB SERPL: 1.7 {RATIO} (ref 1–2)
ALP LIVER SERPL-CCNC: 76 U/L
ALT SERPL-CCNC: 55 U/L
ANION GAP SERPL CALC-SCNC: 8 MMOL/L (ref 0–18)
AST SERPL-CCNC: 27 U/L (ref ?–34)
BASOPHILS # BLD AUTO: 0.08 X10(3) UL (ref 0–0.2)
BASOPHILS NFR BLD AUTO: 1.1 %
BILIRUB SERPL-MCNC: 0.5 MG/DL (ref 0.3–1.2)
BUN BLD-MCNC: 12 MG/DL (ref 9–23)
BUN/CREAT SERPL: 11.5 (ref 10–20)
CALCIUM BLD-MCNC: 10.9 MG/DL (ref 8.7–10.4)
CHLORIDE SERPL-SCNC: 105 MMOL/L (ref 98–112)
CHOLEST SERPL-MCNC: 263 MG/DL (ref ?–200)
CO2 SERPL-SCNC: 28 MMOL/L (ref 21–32)
CREAT BLD-MCNC: 1.04 MG/DL
DEPRECATED RDW RBC AUTO: 43.2 FL (ref 35.1–46.3)
EGFRCR SERPLBLD CKD-EPI 2021: 91 ML/MIN/1.73M2 (ref 60–?)
EOSINOPHIL # BLD AUTO: 0.27 X10(3) UL (ref 0–0.7)
EOSINOPHIL NFR BLD AUTO: 3.6 %
ERYTHROCYTE [DISTWIDTH] IN BLOOD BY AUTOMATED COUNT: 13.2 % (ref 11–15)
FASTING PATIENT LIPID ANSWER: NO
FASTING STATUS PATIENT QL REPORTED: NO
GLOBULIN PLAS-MCNC: 3.1 G/DL (ref 2–3.5)
GLUCOSE BLD-MCNC: 89 MG/DL (ref 70–99)
HCT VFR BLD AUTO: 48.4 %
HDLC SERPL-MCNC: 45 MG/DL (ref 40–59)
HGB BLD-MCNC: 16 G/DL
IMM GRANULOCYTES # BLD AUTO: 0.02 X10(3) UL (ref 0–1)
IMM GRANULOCYTES NFR BLD: 0.3 %
LDLC SERPL CALC-MCNC: 154 MG/DL (ref ?–100)
LYMPHOCYTES # BLD AUTO: 2.88 X10(3) UL (ref 1–4)
LYMPHOCYTES NFR BLD AUTO: 38.7 %
MCH RBC QN AUTO: 29.5 PG (ref 26–34)
MCHC RBC AUTO-ENTMCNC: 33.1 G/DL (ref 31–37)
MCV RBC AUTO: 89.1 FL
MONOCYTES # BLD AUTO: 0.5 X10(3) UL (ref 0.1–1)
MONOCYTES NFR BLD AUTO: 6.7 %
NEUTROPHILS # BLD AUTO: 3.7 X10 (3) UL (ref 1.5–7.7)
NEUTROPHILS # BLD AUTO: 3.7 X10(3) UL (ref 1.5–7.7)
NEUTROPHILS NFR BLD AUTO: 49.6 %
NONHDLC SERPL-MCNC: 218 MG/DL (ref ?–130)
OSMOLALITY SERPL CALC.SUM OF ELEC: 291 MOSM/KG (ref 275–295)
PLATELET # BLD AUTO: 256 10(3)UL (ref 150–450)
POTASSIUM SERPL-SCNC: 4.6 MMOL/L (ref 3.5–5.1)
PROT SERPL-MCNC: 8.3 G/DL (ref 5.7–8.2)
RBC # BLD AUTO: 5.43 X10(6)UL
SODIUM SERPL-SCNC: 141 MMOL/L (ref 136–145)
TRIGL SERPL-MCNC: 342 MG/DL (ref 30–149)
VLDLC SERPL CALC-MCNC: 68 MG/DL (ref 0–30)
WBC # BLD AUTO: 7.5 X10(3) UL (ref 4–11)

## 2024-11-07 PROCEDURE — 80061 LIPID PANEL: CPT | Performed by: FAMILY MEDICINE

## 2024-11-07 PROCEDURE — 99396 PREV VISIT EST AGE 40-64: CPT | Performed by: FAMILY MEDICINE

## 2024-11-07 PROCEDURE — 85025 COMPLETE CBC W/AUTO DIFF WBC: CPT | Performed by: FAMILY MEDICINE

## 2024-11-07 PROCEDURE — 83036 HEMOGLOBIN GLYCOSYLATED A1C: CPT | Performed by: FAMILY MEDICINE

## 2024-11-07 PROCEDURE — 80053 COMPREHEN METABOLIC PANEL: CPT | Performed by: FAMILY MEDICINE

## 2024-11-07 PROCEDURE — 3008F BODY MASS INDEX DOCD: CPT | Performed by: FAMILY MEDICINE

## 2024-11-07 PROCEDURE — 87389 HIV-1 AG W/HIV-1&-2 AB AG IA: CPT | Performed by: FAMILY MEDICINE

## 2024-11-07 PROCEDURE — 3074F SYST BP LT 130 MM HG: CPT | Performed by: FAMILY MEDICINE

## 2024-11-07 PROCEDURE — 3078F DIAST BP <80 MM HG: CPT | Performed by: FAMILY MEDICINE

## 2024-11-07 RX ORDER — ALPRAZOLAM 0.25 MG/1
0.25 TABLET ORAL 2 TIMES DAILY PRN
Qty: 5 TABLET | Refills: 0 | Status: SHIPPED | OUTPATIENT
Start: 2024-11-07

## 2024-11-07 RX ORDER — ATORVASTATIN CALCIUM 10 MG/1
10 TABLET, FILM COATED ORAL DAILY
Qty: 90 TABLET | Refills: 3 | Status: SHIPPED | OUTPATIENT
Start: 2024-11-07

## 2024-11-07 NOTE — PATIENT INSTRUCTIONS
PATIENT INSTRUCTIONS    Thank you for seeing me today, it was a pleasure taking care of you.  Please check out at the  and schedule a follow up appointment.  Return in about 1 year (around 11/7/2025) for physical.  Please remember that the preferred radha period for appointments is 5 minutes. This is to help maximize the amount of time that we can spend together at our visits.    Please get your labs drawn - you may need to schedule a lab appointment if this was not completed at your recent doctor's visit.  The following imaging studies were ordered: None  Please also follow up with the following specialists: GI - evaluation of your liver, weight  Please fill out the advance directive information (power of  documents) and bring a copy to our clinic.  Diet and exercise  Monitor mental health  Consider AA  Restart atorvastatin 10 mg nightly  Can use alprazolam as needed    Best,   Dr. Murillo

## 2024-11-07 NOTE — ASSESSMENT & PLAN NOTE
Patient has been intermittently binge drinking.  Consider AA.  If symptoms more serious, consider substance center follow up  Would benefit from maintaining good mental health

## 2024-11-07 NOTE — ASSESSMENT & PLAN NOTE
Exercise and diet advised.  CBC, CMP, lipid panel, Hba1c, HIV screen  Tdap deferred  Flu shot declined  COVID vaccine advised.  Advanced directive information provided.

## 2024-11-07 NOTE — ASSESSMENT & PLAN NOTE
Continue healthy diet and exercise  Cut back on alcohol usage.  Will refer to GI for further evaluation.

## 2024-11-07 NOTE — ASSESSMENT & PLAN NOTE
PTSD and generalized anxiety disorder.  He reports his mental health is manageable for now.  He prefers not take medication at this time - discussed that this can always be an option though.  Has been on Lexapro in the past.   Has alprazolam that he uses very infrequently for panic attacks and traveling.   Consider restarting therapy.  Follow-up as needed.

## 2024-11-07 NOTE — ASSESSMENT & PLAN NOTE
Diet and exercise advised.  Check labs  Desires to see weight specialist - referral provided.  [facility] weight program information also provided

## 2024-11-08 LAB
EST. AVERAGE GLUCOSE BLD GHB EST-MCNC: 103 MG/DL (ref 68–126)
HBA1C MFR BLD: 5.2 % (ref ?–5.7)

## 2025-03-31 ENCOUNTER — HOSPITAL ENCOUNTER (OUTPATIENT)
Age: 45
Discharge: HOME | End: 2025-03-31
Payer: COMMERCIAL

## 2025-03-31 VITALS
RESPIRATION RATE: 18 BRPM | HEART RATE: 74 BPM | OXYGEN SATURATION: 99 % | SYSTOLIC BLOOD PRESSURE: 126 MMHG | DIASTOLIC BLOOD PRESSURE: 79 MMHG | TEMPERATURE: 99 F

## 2025-03-31 DIAGNOSIS — J02.0 STREPTOCOCCAL SORE THROAT: Primary | ICD-10-CM

## 2025-03-31 LAB — S PYO AG THROAT QL: POSITIVE

## 2025-03-31 PROCEDURE — 99214 OFFICE O/P EST MOD 30 MIN: CPT | Performed by: NURSE PRACTITIONER

## 2025-03-31 PROCEDURE — 87880 STREP A ASSAY W/OPTIC: CPT | Performed by: NURSE PRACTITIONER

## 2025-03-31 RX ORDER — AMOXICILLIN 500 MG/1
500 TABLET, FILM COATED ORAL 2 TIMES DAILY
Qty: 20 TABLET | Refills: 0 | Status: SHIPPED | OUTPATIENT
Start: 2025-03-31 | End: 2025-04-10

## 2025-03-31 NOTE — ED PROVIDER NOTES
Patient Seen in: [facility]      History     Chief Complaint   Patient presents with    Sore Throat     Stated Complaint: Sore Throat    Subjective:   HPI    44 year old male presents with sore throat, intermittent fevers x 1 week.  Positive sick contacts at home with strep.  Here for strep testing. No drooling/trismus/submandibular swelling.  Speech clear and intact. No muffled voice, drooling, sialorrhea.     Denies f/c/n/v/d. No recent sick exposures. Denies recent infections/covid exposures.      Objective:     Past Medical History:    Biceps tendon tear    COVID-19    Generalized anxiety disorder    PTSD (post-traumatic stress disorder)              Past Surgical History:   Procedure Laterality Date    Knee surgery      knee- ACL, MCL    Repair of biceps tendon at elbow  01/2024                Social History     Socioeconomic History    Marital status:    Tobacco Use    Smoking status: Never    Smokeless tobacco: Never   Vaping Use    Vaping status: Never Used   Substance and Sexual Activity    Alcohol use: Yes     Comment: Binge drinking    Drug use: Not Currently     Types: Cannabis    Sexual activity: Yes     Partners: Female   Social History Narrative    Relationships: Wife Martinez    Children: Mane (12M - from Martinez's previous relationship), Emre 6M, Paulina 5F, Mechelle 2F    Pets: None    School: N/A    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 Never really .     Interests:  Enjoys golfing. Very handy - likes to build stuff, small projects. Plays softball consistently during the summers. Spending time with kids.    Spiritual: [employer] background, but doesn't go to [employer] a lot              Review of Systems    Positive for stated complaint: Sore Throat  Other systems are as noted in HPI.  Constitutional and vital signs reviewed.      All other systems reviewed and negative except as noted above.    Physical Exam     ED Triage Vitals  [03/31/25 0949]   /79   Pulse 74   Resp 18   Temp 98.8 °F (37.1 °C)   Temp src Oral   SpO2 99 %   O2 Device None (Room air)       Current Vitals:   Vital Signs  BP: 126/79  Pulse: 74  Resp: 18  Temp: 98.8 °F (37.1 °C)  Temp src: Oral    Oxygen Therapy  SpO2: 99 %  O2 Device: None (Room air)        Physical Exam  Vitals and nursing note reviewed.   HENT:      Head: Normocephalic.      Right Ear: Tympanic membrane normal.      Left Ear: Tympanic membrane normal.      Nose: Nose normal.      Mouth/Throat:      Mouth: Mucous membranes are moist.      Pharynx: Uvula midline. Posterior oropharyngeal erythema and uvula swelling present. No pharyngeal swelling.      Tonsils: Tonsillar exudate present. No tonsillar abscesses. 3+ on the right. 3+ on the left.   Eyes:      Pupils: Pupils are equal, round, and reactive to light.   Cardiovascular:      Rate and Rhythm: Normal rate and regular rhythm.   Pulmonary:      Effort: Pulmonary effort is normal. No respiratory distress.      Breath sounds: No wheezing.   Abdominal:      General: There is no distension.      Tenderness: There is no abdominal tenderness.   Musculoskeletal:         General: Normal range of motion.      Cervical back: Normal range of motion.   Skin:     General: Skin is warm and dry.   Neurological:      Mental Status: He is alert.             ED Course     Labs Reviewed   POCT RAPID STREP - Abnormal; Notable for the following components:       Result Value    POCT Rapid Strep Positive (*)     All other components within normal limits                   MDM             Medical Decision Making  44 year old male p/w sore throat XX days. Here for strep testing. Strep testing is positive. Rx sent for amoxicillin. Discussed supportive care. VSS. Speech clear and intact. No concern at this time for PTA    Plan:   - home with supportive care  - tylenol, motrin prn  - f/u with PCP    Physical exam remained stable over serial reexaminations as previously  documented. External chart review completed, no recent hospitalizations for the same.     I have discussed with the patient the results of tests, differential diagnosis, and warning signs and symptoms that should prompt immediate return.  The patient understands these instructions and agrees to the follow-up plan provided.  There are no barriers to learning.   Appropriate f/u given.  Patient agrees to return for any concerns/problems/complications.    Differential diagnosis reflecting the complexity of care include: URI, sinusitis, covid, strep pharyngitis, viral pharyngitis, AOM, OME, OE, PTA    Comorbidities that add complexity to management include:na    External chart review was done and was noted:y    History obtained by an independent source was from:Patient    Discussions of management was done with:Patient    My independent interpretation of studies of:na    Diagnostic tests and medications considered but not ordered were:none    Social determinants of health that affect care:na    Shared decision making was done by patient, self           Disposition and Plan     Clinical Impression:  1. Streptococcal sore throat         Disposition:  Discharge  3/31/2025  9:58 am    Follow-up:  Mariusz Murillo MD  [address] [place] [id number]  [phone]                Medications Prescribed:  Current Discharge Medication List        START taking these medications    Details   amoxicillin 500 MG Oral Tab Take 1 tablet (500 mg total) by mouth 2 (two) times daily for 10 days.  Qty: 20 tablet, Refills: 0                 Supplementary Documentation:

## 2025-06-09 ENCOUNTER — OFFICE VISIT (OUTPATIENT)
Dept: INTERNAL MEDICINE CLINIC | Facility: CLINIC | Age: 45
End: 2025-06-09
Payer: COMMERCIAL

## 2025-06-09 VITALS
HEIGHT: 72 IN | WEIGHT: 249 LBS | BODY MASS INDEX: 33.72 KG/M2 | OXYGEN SATURATION: 98 % | SYSTOLIC BLOOD PRESSURE: 124 MMHG | HEART RATE: 74 BPM | DIASTOLIC BLOOD PRESSURE: 86 MMHG | TEMPERATURE: 98 F

## 2025-06-09 DIAGNOSIS — R07.9 CHEST PAIN, UNSPECIFIED TYPE: Primary | ICD-10-CM

## 2025-06-09 DIAGNOSIS — F43.10 PTSD (POST-TRAUMATIC STRESS DISORDER): ICD-10-CM

## 2025-06-09 DIAGNOSIS — F41.1 GENERALIZED ANXIETY DISORDER: ICD-10-CM

## 2025-06-09 PROCEDURE — G2211 COMPLEX E/M VISIT ADD ON: HCPCS | Performed by: FAMILY MEDICINE

## 2025-06-09 PROCEDURE — 3079F DIAST BP 80-89 MM HG: CPT | Performed by: FAMILY MEDICINE

## 2025-06-09 PROCEDURE — 3074F SYST BP LT 130 MM HG: CPT | Performed by: FAMILY MEDICINE

## 2025-06-09 PROCEDURE — 3008F BODY MASS INDEX DOCD: CPT | Performed by: FAMILY MEDICINE

## 2025-06-09 PROCEDURE — 99213 OFFICE O/P EST LOW 20 MIN: CPT | Performed by: FAMILY MEDICINE

## 2025-06-09 NOTE — PATIENT INSTRUCTIONS
PATIENT INSTRUCTIONS    Thank you for seeing me today, it was a pleasure taking care of you.  Please check out at the  and schedule a follow up appointment.  Return in about 6 months (around 12/9/2025) for physical.  Please remember that the preferred radha period for appointments is 5 minutes. This is to help maximize the amount of time that we can spend together at our visits.    If pain is bothersome can take tylenol or advil as needed  Rest  Avoid strenuous activity    Best,  Dr. Murillo

## 2025-06-09 NOTE — ASSESSMENT & PLAN NOTE
Patient with periodic intermittent very short lasting sharp left-sided chest pain.  Seems to be musculoskeletal etiology  Can also consider some sort of nerve impingement  He is in no pain right now.  He does have some tenderness to the left chest wall that reproduces his pain.  Advised taking Tylenol Advil as needed  Rest and avoid strenuous activity and heavy lifting if possible.  ER precautions discussed.  Follow-up as needed.   Wheelchair

## 2025-06-09 NOTE — PROGRESS NOTES
FAMILY MEDICINE CLINIC NOTE    HPI  Anoop Santana is a 44 year old male presenting for     #CP  -pin typically at the lateral left chest wall, a few times at the nipple  -occurs about once a day  -sharp shooting pain, a couple seconds  -ongoing for 4-5 days   -no pain currently  -no SOB  -does  2 year old daughter  -also does sports with kids  -no palpitations  -no fever/chills, no coughing       #PTSD  #GERMÁN  -related to trauma from  cases  -no SI/HI  -no AVH  -denies scotty symptoms  -violent horrible dreams in the past  -does note some occasional outbursts  -does have hypervigilance   -+exposure, +intrusion, +avoidance, +altered mood, +alerted reactivity  -has seen psychologist  Dr Galindo in the past  -has been on escitalopram in the past  11/7/2024  -not currently on any medication  -alprazolam as needed - especially for flights  -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 Hepatic steatosis versus hepatocellular disease.  No focal hepatic lesions. 2. Normal gallbladder ultrasound.  Normal common duct. 3. No free fluid hepatorenal fossa. No hydronephrosis right kidney.   -referred to GI  -now binge drinking more regularly     #Hyperlipidemia  -6/2018   -atorvastatin 10 mg - taking     #Binge drinking  -not drinking regularly  -about 10 drinks  -once a month   -drinks until he nearly blacks out     #Biceps tear---resolved  -histor of left open biceps repair 1/2024   -surgery Dr Tavares  -completed physical therapy           ROS  GENERAL: No fever/chills, no recent weight loss  HEENT: No visual changes, no changes in hearing, no sore  throats  NECK: No pain, no swelling  RESP: No cough, no SOB  CV: + Chest pain, no palpitations  GI: No abd pain, no N/V/D  MSK: No edema  SKIN: No new rashes  NEURO: No numbness, no tingling, no headaches    HEALTH MAINTENANCE  Health Maintenance Topics with due status: Overdue       Topic Date Due    Pneumococcal Vaccine: Birth to 50yrs Never done    DTaP,Tdap,and Td Vaccines Never done    COVID-19 Vaccine Never done    Annual Depression Screening 01/01/2025       ALLERGIES  Allergies[1]    MEDICATIONS  Current Medications[2]    ACTIVE PROBLEMS  Problem List[3]    PAST MEDICAL HISTORY  Past Medical History[4]    PAST SOCIAL HISTORY  Social History     Socioeconomic History    Marital status:      Spouse name: Not on file    Number of children: Not on file    Years of education: Not on file    Highest education level: Not on file   Occupational History    Not on file   Tobacco Use    Smoking status: Never    Smokeless tobacco: Never   Vaping Use    Vaping status: Never Used   Substance and Sexual Activity    Alcohol use: Yes     Comment: Binge drinking    Drug use: Not Currently     Types: Cannabis    Sexual activity: Yes     Partners: Female   Other Topics Concern    Caffeine Concern Not Asked    Exercise Not Asked    Seat Belt Not Asked    Special Diet Not Asked    Stress Concern Not Asked    Weight Concern Not Asked   Social History Narrative    Relationships: Wife Martinez    Children: Mane (12M - from Martinez's previous relationship), Emre 6M, Paulina 5F, Mechelle 2F    Pets: None    School: N/A    Work: Retired . IT .     Origin: [place], [place]. Never really .     Interests:  Enjoys golfing. Very handy - likes to build stuff, small projects. Plays softball consistently during the summers. Spending time with kids.    Spiritual: Jehovah's witness background, but doesn't go to [employer] a lot     Social Drivers of Health     Food Insecurity: Not on file   Transportation Needs: Not  on file   Stress: Not on file   Housing Stability: Not on file       PAST SURGICAL HISTORY  Past Surgical History[5]    PAST FAMILY HISTORY  Family History[6]      PHYSICAL EXAM  Vitals:    06/09/25 0852   BP: 124/86   Pulse: 74   Temp: 98.4 °F (36.9 °C)   SpO2: 98%   Weight: 249 lb (112.9 kg)   Height: 6' (1.829 m)      Body mass index is 33.77 kg/m².    GENERAL: NAD, somewhat anxious appearing  RESP: Non-labored respirations, CTAB, no wheezing, no rales, no rhonchi  CV: RRR, no murmurs  MSK: No edema.  Mild left lateral chest wall tenderness palpation.  SKIN: Warm and dry, no rashes  NEURO: Answering questions appropriately    LABS  Lab Results   Component Value Date    WBC 7.5 11/07/2024    HGB 16.0 11/07/2024    HCT 48.4 11/07/2024    .0 11/07/2024    NEPERCENT 49.6 11/07/2024    LYPERCENT 38.7 11/07/2024    MOPERCENT 6.7 11/07/2024    EOPERCENT 3.6 11/07/2024    BAPERCENT 1.1 11/07/2024    NE 3.70 11/07/2024    LYMABS 2.88 11/07/2024    MOABSO 0.50 11/07/2024    EOABSO 0.27 11/07/2024    BAABSO 0.08 11/07/2024       Lab Results   Component Value Date     11/07/2024    K 4.6 11/07/2024     11/07/2024    CO2 28.0 11/07/2024    ANIONGAP 8 11/07/2024    BUN 12 11/07/2024    CREATSERUM 1.04 11/07/2024    BUNCREA 11.5 11/07/2024    GLU 89 11/07/2024    CA 10.9 (H) 11/07/2024    OSMOCALC 291 11/07/2024    GFRNAA 100 08/01/2022    GFRAA 116 08/01/2022    ALT 55 (H) 11/07/2024    AST 27 11/07/2024    ALKPHO 76 11/07/2024    BILT 0.5 11/07/2024    TP 8.3 (H) 11/07/2024    ALB 5.2 (H) 11/07/2024    GLOBULIN 3.1 11/07/2024    ELECTAG 1.7 11/07/2024    FASTING No 11/07/2024    FASTING No 11/07/2024         Lab Results   Component Value Date    CHOLEST 263 (H) 11/07/2024    TRIG 342 (H) 11/07/2024    HDL 45 11/07/2024     (H) 11/07/2024    VLDL 68 (H) 11/07/2024    NONHDLC 218 (H) 11/07/2024        DIAGNOSTICS      ASSESSMENT/PLAN  Problem List Items Addressed This Visit          Cardiac and  Vasculature    Chest pain - Primary    Patient with periodic intermittent very short lasting sharp left-sided chest pain.  Seems to be musculoskeletal etiology  Can also consider some sort of nerve impingement  He is in no pain right now.  He does have some tenderness to the left chest wall that reproduces his pain.  Advised taking Tylenol Advil as needed  Rest and avoid strenuous activity and heavy lifting if possible.  ER precautions discussed.  Follow-up as needed.            Mental Health    Generalized anxiety disorder    PTSD and generalized anxiety disorder.  He reports his mental health is manageable for now.  He prefers not take medication at this time - discussed that this can always be an option though.  Has been on Lexapro in the past.   Has alprazolam that he uses very infrequently for panic attacks and traveling.   Consider restarting therapy.  Follow-up as needed.         PTSD (post-traumatic stress disorder)    PTSD and generalized anxiety disorder.  He reports his mental health is manageable for now.  He prefers not take medication at this time - discussed that this can always be an option though.  Has been on Lexapro in the past.   Has alprazolam that he uses very infrequently for panic attacks and traveling.   Consider restarting therapy.  Follow-up as needed.            Return in about 6 months (around 2025) for physical.    This is a Header Test   Topic Date Due    Annual Physical  2025        Mariusz Murillo MD  Family Medicine           Pre-chartin minutes  Reviewing/obtainin minutes  Medical Exam:1 minutes  Counseling/education: 5 minutes  Notes: 2 minutes  Referring/communicatin minutes  Care coordination: 0 minutes    My total time spent caring for the patient on the day of the encounter: 14 minutes         [1]   Allergies  Allergen Reactions    Seasonal FACE FLUSHING    Iodine (Topical) RASH   [2]   Current Outpatient Medications   Medication Sig Dispense Refill     atorvastatin 10 MG Oral Tab Take 1 tablet (10 mg total) by mouth daily. 90 tablet 3    ALPRAZolam 0.25 MG Oral Tab Take 1 tablet (0.25 mg total) by mouth 2 (two) times daily as needed for Anxiety. 5 tablet 0   [3]   Patient Active Problem List  Diagnosis    Mixed hyperlipidemia    Elevated LFTs    Generalized anxiety disorder    PTSD (post-traumatic stress disorder)    Allergic rhinitis    Alcohol consumption binge drinking    Health maintenance examination    Class 1 obesity due to excess calories without serious comorbidity with body mass index (BMI) of 33.0 to 33.9 in adult    Chest pain   [4]   Past Medical History:   Biceps tendon tear    COVID-19    Generalized anxiety disorder    PTSD (post-traumatic stress disorder)   [5]   Past Surgical History:  Procedure Laterality Date    Knee surgery      knee- ACL, MCL    Repair of biceps tendon at elbow  01/2024   [6]   Family History  Problem Relation Age of Onset    Cancer Mother         lung cancer, smoker    Heart Disease Mother     Other (Parathyroid) Mother     Anxiety Mother     Arrhythmia Mother     Diabetes Father     Other (Other) Maternal Grandmother         Aneurysm    Diabetes Maternal Grandfather     Kidney Disease Maternal Grandfather         ESRD    No Known Problems Paternal Grandmother     No Known Problems Paternal Grandfather     Colon Cancer Neg     Prostate Cancer Neg

## (undated) NOTE — LETTER
Date & Time: 10/10/2022, 10:00 AM  Patient: Christi Holiday  Encounter Provider(s):    EL Arroyo       To Whom It May Concern:    Arun Bertrand was seen and treated in our department on 10/10/2022. He should be allowed to return to work with the following restrictions:   Working in an area that will not require the use of running, heavy lifting or physical combat  For the next 2 weeks or until otherwise instructed by his doctor or orthopedist.    If you have any questions or concerns, please do not hesitate to call.        _____________________________  Physician/APC Signature

## (undated) NOTE — LETTER
Date: 1/13/2022    Patient Name: Trinidad Paredes          To Whom it may concern: This letter has been written at the patient's request. The above patient was seen for evaluation of a medical condition.     The patient may return to full duty work today

## (undated) NOTE — LETTER
Dr. Abraham Bonilla MD     [facility] [id number] [phone]       02/28/22    Rachel Blackburn     To whom it may concern,    Augustus Ernandez has been my patient since June of 2018. He has never been in trouble for drinking related issues or had an alcohol problem that I am aware of. He has never had any ER visits or hospitalizations for drinking related issues either. He comes every year for his annual physical and tries to take good care of his health.     Please let me know if you have any questions,      Sunshine Edwards MD

## (undated) NOTE — LETTER
:  1980      To Whom It May Concern: This patient had a telephone visit on  10/07/21 . Work status:   May return to work full-time, no restrictions    May return to work status per above effective 10/7/21    If this office may be of further a

## (undated) NOTE — LETTER
Date: 8/1/2022    Patient Name: Ayde Sherwood          To Whom it may concern: This letter has been written at the patient's request. The above patient was seen at the [facility] for treatment of a medical condition. This patient should be excused from attending work from 8/1/22-9/1/22.        Sincerely,     Kedar Best MD

## (undated) NOTE — LETTER
Date: 9/30/2022    Patient Name: Giovani Wellington          To Whom it may concern: This letter has been written at the patient's request. The above patient was seen at the [facility] for treatment of a medical condition.     This patient should be excused from attending work through 10/30/22      Sincerely,       Lora Sorensen MD

## (undated) NOTE — LETTER
Date: 6/30/2022    Patient Name: Magi Murphy          To Whom it may concern: This letter has been written at the patient's request. The above patient was seen at the [facility] for treatment of a medical condition. This patient should be excused from attending work from 7/1/22 - 8/1/22.         Sincerely,       Suleiman Burden MD

## (undated) NOTE — LETTER
Date: 9/1/2022    Patient Name: Rachel Blackburn          To Whom it may concern: This letter has been written at the patient's request. The above patient was seen at the [facility] for treatment of a medical condition. This patient should be excused from attending work through 10/1/22.         Sincerely,       Jacklin Crigler, MD

## (undated) NOTE — LETTER
Date: 6/2/2022    Patient Name: Luba Jeffries          To Whom it may concern: This letter has been written at the patient's request. The above patient was seen at the [facility] for treatment of a medical condition. This patient should be excused from attending work through 7/2/22.     Sincerely,       Milind Shahid MD

## (undated) NOTE — LETTER
Date: 5/3/2022    Patient Name: Ligia No          To Whom it may concern: This letter has been written at the patient's request. The above patient was seen at the [facility] for treatment of a medical condition. This patient should be excused from attending work through 6/3/22.        Sincerely,       Keyon Ruth MD

## (undated) NOTE — LETTER
Date & Time: 9/2/2019, 11:28 AM  Patient: Dariela Sadler  Encounter Provider(s):    Grace Lopez MD       To Whom It May Concern:    Darrel Simon was seen and treated in our department on 9/2/2019. He May not return to work for 24 hours. .    If you

## (undated) NOTE — LETTER
Date & Time: 11/17/2019, 1:33 PM  Patient: Linda Spotted  Encounter Provider(s):    Staci Jenkins MD       To Whom It May Concern:    Miriam Zaragoza was seen and treated in our department on 11/15/2019. He return to work without any restrictions. Bella Barrera

## (undated) NOTE — LETTER
20          Megan Chew  :  1980      To Whom It May Concern: This patient had a phone visit on 20 . Work status: May return to work full-time, no restrictions on 12/10/20.       If this office may be of further assistan